# Patient Record
Sex: FEMALE | Race: WHITE | NOT HISPANIC OR LATINO | Employment: UNEMPLOYED | URBAN - METROPOLITAN AREA
[De-identification: names, ages, dates, MRNs, and addresses within clinical notes are randomized per-mention and may not be internally consistent; named-entity substitution may affect disease eponyms.]

---

## 2017-01-04 ENCOUNTER — PRENATAL OFFICE VISIT (OUTPATIENT)
Dept: OBGYN | Facility: CLINIC | Age: 27
End: 2017-01-04
Payer: COMMERCIAL

## 2017-01-04 VITALS
WEIGHT: 113 LBS | DIASTOLIC BLOOD PRESSURE: 65 MMHG | SYSTOLIC BLOOD PRESSURE: 109 MMHG | HEIGHT: 64 IN | BODY MASS INDEX: 19.29 KG/M2 | TEMPERATURE: 97.6 F | OXYGEN SATURATION: 100 % | HEART RATE: 68 BPM

## 2017-01-04 DIAGNOSIS — R20.2 PARESTHESIAS: ICD-10-CM

## 2017-01-04 DIAGNOSIS — Z34.80 ENCOUNTER FOR SUPERVISION OF OTHER NORMAL PREGNANCY: Primary | ICD-10-CM

## 2017-01-04 LAB
ANION GAP SERPL CALCULATED.3IONS-SCNC: 10 MMOL/L (ref 3–14)
BUN SERPL-MCNC: 10 MG/DL (ref 7–30)
CALCIUM SERPL-MCNC: 8.7 MG/DL (ref 8.5–10.1)
CHLORIDE SERPL-SCNC: 104 MMOL/L (ref 94–109)
CO2 SERPL-SCNC: 25 MMOL/L (ref 20–32)
CREAT SERPL-MCNC: 0.57 MG/DL (ref 0.52–1.04)
GFR SERPL CREATININE-BSD FRML MDRD: NORMAL ML/MIN/1.7M2
GLUCOSE SERPL-MCNC: 84 MG/DL (ref 70–99)
MAGNESIUM SERPL-MCNC: 1.8 MG/DL (ref 1.6–2.3)
POTASSIUM SERPL-SCNC: 3.8 MMOL/L (ref 3.4–5.3)
SODIUM SERPL-SCNC: 139 MMOL/L (ref 133–144)

## 2017-01-04 PROCEDURE — 99207 ZZC PRENATAL VISIT: CPT | Performed by: OBSTETRICS & GYNECOLOGY

## 2017-01-04 PROCEDURE — 36415 COLL VENOUS BLD VENIPUNCTURE: CPT | Performed by: OBSTETRICS & GYNECOLOGY

## 2017-01-04 PROCEDURE — 80048 BASIC METABOLIC PNL TOTAL CA: CPT | Performed by: OBSTETRICS & GYNECOLOGY

## 2017-01-04 PROCEDURE — 83735 ASSAY OF MAGNESIUM: CPT | Performed by: OBSTETRICS & GYNECOLOGY

## 2017-01-04 NOTE — PATIENT INSTRUCTIONS
If you have any questions regarding your visit, Please contact your care team.     [facility]: [phone]    [facility] CLINIC HOURS TELEPHONE NUMBER   HAYLEY Feng-    Roxanna Beltran-BLAISE Rodríguez-Medical Assistant   Monday-[place]  8:00a.m-4:45 p.m  Wednesday-[place] 8:00a.m-4:45 p.m.  Thursday-[place]  8:00a.m-4:45 p.m.  Friday-[place]  8:00a.m-4:45 p.m. [facility]  [address]  [place], [place] [address] [phone] ask [facility]  [phone] Fax  Imaging [phone]    [facility] Labor and Delivery  [address] [place] [address] Dr.  [place], [place] [address] [phone]    [facility]  [address] [place] [address]  [phone] ask [facility]  [phone] Fax  Imaging [phone]     Urgent Care locations:    [place]        [place] Monday-Friday  5 pm - 9 pm  Saturday and Sunday   9 am - 5 pm    Monday-Friday   11 am - 9 pm  Saturday and Sunday   9 am - 5 pm   [phone]       If you need a medication refill, please contact your pharmacy. Please allow 3 business days for your refill to be completed.  As always, Thank you for trusting us with your healthcare needs!

## 2017-01-04 NOTE — NURSING NOTE
"Chief Complaint   Patient presents with     Prenatal Care     eye issue, numbness, dizziness       Initial /65 mmHg  Pulse 68  Temp(Src) 97.6  F (36.4  C) (Oral)  Ht 5' 4\" (1.626 m)  Wt 113 lb (51.256 kg)  BMI 19.39 kg/m2  SpO2 100%  LMP 08/23/2016 Estimated body mass index is 19.39 kg/(m^2) as calculated from the following:    Height as of this encounter: 5' 4\" (1.626 m).    Weight as of this encounter: 113 lb (51.256 kg).  BP completed using cuff size: regular. Brandon, CMA      "

## 2017-01-04 NOTE — MR AVS SNAPSHOT
After Visit Summary   1/4/2017    Marizol Craig    MRN: [id number]           Patient Information     Date Of Birth          1990        Visit Information        Provider Department      1/4/2017 3:45 PM Carlos Frost MD [facility]        Care Instructions                                                        If you have any questions regarding your visit, Please contact your care team.     [facility]: [phone]    [facility] CLINIC HOURS TELEPHONE NUMBER   Carlos Frost M.D.      Zenaida-    Roxanna Beltran-BLAISE Rodríguez-Medical Assistant   Monday-[place]  8:00a.m-4:45 p.m  Wednesday-[place] 8:00a.m-4:45 p.m.  Thursday-[place]  8:00a.m-4:45 p.m.  [facility]  8:00a.m-4:45 p.m. [facility]  [address] [place] [address].  [place], [place] [address] [phone] [facility]  [phone] Fax  Imaging [phone]    [facility] Labor and Delivery  [address] [place] [address] [address].  [place], [place] [address] [phone]    [facility]  [address] [facility] [place] [address]  [phone] [facility]  [phone] Fax  Imaging [phone]     Urgent Care locations:    [facility] Monday-Friday  5 pm - 9 pm  Saturday and Sunday   9 am - 5 pm    Monday-Friday   11 am - 9 pm  Saturday and Sunday   9 am - 5 pm   [phone]       If you need a medication refill, please contact your pharmacy. Please allow 3 business days for your refill to be completed.  As always, Thank you for trusting us with your healthcare needs!          Follow-ups after your visit        Your next 10 appointments already scheduled     Jan 04, 2017  3:45 PM   ESTABLISHED PRENATAL with Carlos Frost MD   [facility] ([facility])    [address] [facility] [address]   [phone]            Constantine 10,  "2017  4:20 PM   US OB > 14 WEEKS COMPLETE SINGLE with MGUS1, MG [facility] ([facility])    [address] [place] [address] [phone]           Please bring a list of your medicines (including vitamins, minerals and over-the-counter drugs). Also, tell your doctor about any allergies you may have. Wear comfortable clothes and leave your valuables at home.  If you re less than 20 weeks drink four 8-ounce glasses of fluid an hour before your exam. If you need to empty your bladder before your exam, try to release only a little urine. Then, drink another glass of fluid.  You may have up to two family members in the exam room. If you bring a small child, an adult must be there to care for him or her.  Please call the Imaging Department at your exam site with any questions.              Who to contact     If you have questions or need follow up information about today's clinic visit or your schedule please contact [facility] directly at [phone].  Normal or non-critical lab and imaging results will be communicated to you by [employer], letter or phone within 4 business days after the clinic has received the results. If you do not hear from us within 7 days, please contact the clinic through [employer]t or phone. If you have a critical or abnormal lab result, we will notify you by phone as soon as possible.  Submit refill requests through [employer] or call your pharmacy and they will forward the refill request to us. Please allow 3 business days for your refill to be completed.          Additional Information About Your Visit        [employer] Information     [employer] lets you send messages to your doctor, view your test results, renew your prescriptions, schedule appointments and more. To sign up, go to [email or web address] . Click on \"Log in\" on the left side of the screen, which will take you to the Welcome page. Then click on \"Sign up Now\" " "on the right side of the page.     You will be asked to enter the access code listed below, as well as some personal information. Please follow the directions to create your username and password.     Your access code is: [email or web address]  Expires: 3/7/2017 10:26 AM     Your access code will  in 90 days. If you need help or a new code, please call your [facility] clinic or [phone].        Care EveryWhere ID     This is your Care EveryWhere ID. This could be used by other organizations to access your [facility] medical records  [id number]        Your Vitals Were     Pulse Temperature Height BMI (Body Mass Index) Pulse Oximetry Last Period    68 97.6  F (36.4  C) (Oral) 5' 4\" (1.626 m) 19.39 kg/m2 100% 2016       Blood Pressure from Last 3 Encounters:   17 109/65   16 110/76   16 84/60    Weight from Last 3 Encounters:   17 113 lb (51.256 kg)   16 107 lb (48.535 kg)   16 107 lb (48.535 kg)              Today, you had the following     No orders found for display       Primary Care Provider    [facility]       No address on file        Thank you!     Thank you for choosing [facility]  for your care. Our goal is always to provide you with excellent care. Hearing back from our patients is one way we can continue to improve our services. Please take a few minutes to complete the written survey that you may receive in the mail after your visit with us. Thank you!             Your Updated Medication List - Protect others around you: Learn how to safely use, store and throw away your medicines at www.disposemymeds.org.          This list is accurate as of: 17  3:38 PM.  Always use your most recent med list.                   Brand Name Dispense Instructions for use    PRENATAL PO            "

## 2017-01-05 NOTE — PROGRESS NOTES
Planned pregnancy. Doing ok. No signif signs, symptoms or concerns except episodic eye twitching, numbness in fingers and tongue, tingling in legs, and dizziness- labs sent and observe. Advice as per Anticipatory Guidance/Checklist update. Discussed condition, tests and care plan including RBA. Problem list updated. Survey u/s next.  Carlos Frost MD

## 2017-01-10 ENCOUNTER — RADIANT APPOINTMENT (OUTPATIENT)
Dept: ULTRASOUND IMAGING | Facility: CLINIC | Age: 27
End: 2017-01-10
Attending: OBSTETRICS & GYNECOLOGY
Payer: COMMERCIAL

## 2017-01-10 DIAGNOSIS — Z34.80 ENCOUNTER FOR SUPERVISION OF OTHER NORMAL PREGNANCY: ICD-10-CM

## 2017-01-10 PROCEDURE — 76805 OB US >/= 14 WKS SNGL FETUS: CPT | Performed by: RADIOLOGY

## 2017-01-13 ENCOUNTER — TELEPHONE (OUTPATIENT)
Dept: OBGYN | Facility: CLINIC | Age: 27
End: 2017-01-13

## 2017-01-13 NOTE — TELEPHONE ENCOUNTER
Pt had US done on 1/10/17. Looks like it was normal except  forRight ventricular outflow tract not well  visualized. No abnormality identified. Patient was told she would need a follow up US. Ok to place order? She would like to schedule.  Velma Villegas LPN

## 2017-01-14 NOTE — TELEPHONE ENCOUNTER
Will discuss this at next OB visit. This is a common limitation of u/s and follow-up may be considered but not required. We should wait until there has been some fetal growth to check this again. Please notify.   Carlos Frost MD

## 2017-01-30 ENCOUNTER — PRENATAL OFFICE VISIT (OUTPATIENT)
Dept: OBGYN | Facility: CLINIC | Age: 27
End: 2017-01-30
Payer: COMMERCIAL

## 2017-01-30 VITALS
RESPIRATION RATE: 16 BRPM | DIASTOLIC BLOOD PRESSURE: 53 MMHG | HEART RATE: 75 BPM | WEIGHT: 118 LBS | SYSTOLIC BLOOD PRESSURE: 97 MMHG | BODY MASS INDEX: 20.24 KG/M2

## 2017-01-30 DIAGNOSIS — Z34.80 ENCOUNTER FOR SUPERVISION OF OTHER NORMAL PREGNANCY: Primary | ICD-10-CM

## 2017-01-30 PROCEDURE — 99207 ZZC PRENATAL VISIT: CPT | Performed by: OBSTETRICS & GYNECOLOGY

## 2017-01-30 NOTE — NURSING NOTE
"Chief Complaint   Patient presents with     Prenatal Care     OBV 22w6d       Initial BP 97/53 mmHg  Pulse 75  Resp 16  Wt 53.524 kg (118 lb)  LMP 08/23/2016  Breastfeeding? No Estimated body mass index is 20.24 kg/(m^2) as calculated from the following:    Height as of 1/4/17: 1.626 m (5' 4\").    Weight as of this encounter: 53.524 kg (118 lb).  BP completed using cuff size: regular Right arm  Pepei ISAIAS Rasheed      "

## 2017-01-30 NOTE — MR AVS SNAPSHOT
After Visit Summary   1/30/2017    Marizol Craig    MRN: [id number]           Patient Information     Date Of Birth          1990        Visit Information        Provider Department      1/30/2017 4:15 PM Carlos Frost MD [facility]        Today's Diagnoses     Encounter for supervision of other normal pregnancy    -  1       Care Instructions                                                        If you have any questions regarding your visit, Please contact your care team.     [facility] Access Services: [phone]    [facility] CLINIC HOURS TELEPHONE NUMBER   Carlos Frost M.D.      Zenaida-    Roxanna Beltran-BLAISE Rodríguez-Medical Assistant   Monday-[place]  8:00a.m-4:45 p.m  Wednesday-[place] 8:00a.m-4:45 p.m.  Thursday-[place]  8:00a.m-4:45 p.m.  Friday-[place]  8:00a.m-4:45 p.m. [facility]  [address]  [place], [place] [address] [phone] ask for [facility]  [phone] Fax  Imaging [phone]    [facility] Labor and Delivery  [address] [place] [address]  [phone]    [facility]  [address] [facility] [place] [address]  [phone] ask [facility]  [phone] Fax  Imaging [phone]     Urgent Care locations:    [facility] Monday-Friday  5 pm - 9 pm  Saturday and Sunday   9 am - 5 pm    Monday-Friday   11 am - 9 pm  Saturday and Sunday   9 am - 5 pm   [phone]       If you need a medication refill, please contact your pharmacy. Please allow 3 business days for your refill to be completed.  As always, Thank you for trusting us with your healthcare needs!          Follow-ups after your visit        Who to contact     If you have questions or need follow up information about today's clinic visit or your schedule please contact [facility] directly at  "[phone].  Normal or non-critical lab and imaging results will be communicated to you by MyChart, letter or phone within 4 business days after the clinic has received the results. If you do not hear from us within 7 days, please contact the clinic through RAB[employer]art or phone. If you have a critical or abnormal lab result, we will notify you by phone as soon as possible.  Submit refill requests through Sp[employer] or call your pharmacy and they will forward the refill request to us. Please allow 3 business days for your refill to be completed.          Additional Information About Your Visit        MyChart Information     Spime lets you send messages to your doctor, view your test results, renew your prescriptions, schedule appointments and more. To sign up, go to [email or web address] . Click on \"Log in\" on the left side of the screen, which will take you to the Welcome page. Then click on \"Sign up Now\" on the right side of the page.     You will be asked to enter the access code listed below, as well as some personal information. Please follow the directions to create your username and password.     Your access code is: [email or web address]  Expires: 3/7/2017 10:26 AM     Your access code will  in 90 days. If you need help or a new code, please call your [facility] clinic or [phone].        Care EveryWhere ID     This is your Care EveryWhere ID. This could be used by other organizations to access your [facility] medical records  [id number]        Your Vitals Were     Pulse Respirations Last Period Breastfeeding?          75 16 2016 No         Blood Pressure from Last 3 Encounters:   17 97/53   17 109/65   16 110/76    Weight from Last 3 Encounters:   17 53.524 kg (118 lb)   17 51.256 kg (113 lb)   16 48.535 kg (107 lb)              Today, you had the following     No orders found for display       Primary Care Provider    [facility]       No " address on file        Thank you!     Thank you for choosing [facility]  for your care. Our goal is always to provide you with excellent care. Hearing back from our patients is one way we can continue to improve our services. Please take a few minutes to complete the written survey that you may receive in the mail after your visit with us. Thank you!             Your Updated Medication List - Protect others around you: Learn how to safely use, store and throw away your medicines at www.disposemymeds.org.          This list is accurate as of: 1/30/17  4:40 PM.  Always use your most recent med list.                   Brand Name Dispense Instructions for use    PRENATAL PO

## 2017-01-31 NOTE — PROGRESS NOTES
Doing well. No signif signs, symptoms or concerns. Prior sx resolved with dec caffeine. Advice as per Checklist update. Discussed condition, tests and care plan including RBA. Problem list updated. 1h GTT next.  Carlos Frost MD

## 2017-02-10 ENCOUNTER — TELEPHONE (OUTPATIENT)
Dept: OBGYN | Facility: CLINIC | Age: 27
End: 2017-02-10

## 2017-02-10 DIAGNOSIS — Z34.80 ENCOUNTER FOR SUPERVISION OF OTHER NORMAL PREGNANCY: Primary | ICD-10-CM

## 2017-02-10 NOTE — TELEPHONE ENCOUNTER
Left a message for patient to call back at [phone] and ask for Angelica in women's. In pregnancy it is safe to use over the counter Monistat 3 or 7 day or Gyne-Lotrimin.  Would need to be seen in clinic if she continues to have symptoms.  Angelica Ventura RN

## 2017-02-10 NOTE — TELEPHONE ENCOUNTER
Patient called back.  She has noticed only in the evenings some minor itch and burning sensations.  During the day she is asymptomatic.  She  had a yeast infection during her previous pregnancy and said symptoms are somewhat similar.  I advised otc Monistat.  I encouraged her to increase her water intake.  She will follow up in clinic if her symptoms persist or worsen.  Angelica Ventura RN

## 2017-02-10 NOTE — TELEPHONE ENCOUNTER
Pt thinks she may have a yeast infection  Calling to see what you recommend for treatment    Call to advise and ok to leave message  Marizol Craig (Self) [phone] (H)    Uses [facility] [phone]

## 2017-02-27 ENCOUNTER — PRENATAL OFFICE VISIT (OUTPATIENT)
Dept: OBGYN | Facility: CLINIC | Age: 27
End: 2017-02-27
Payer: COMMERCIAL

## 2017-02-27 VITALS
WEIGHT: 123 LBS | DIASTOLIC BLOOD PRESSURE: 67 MMHG | BODY MASS INDEX: 21.11 KG/M2 | HEART RATE: 75 BPM | SYSTOLIC BLOOD PRESSURE: 110 MMHG

## 2017-02-27 DIAGNOSIS — Z34.80 ENCOUNTER FOR SUPERVISION OF OTHER NORMAL PREGNANCY: ICD-10-CM

## 2017-02-27 LAB
GLUCOSE 1H P 50 G GLC PO SERPL-MCNC: 141 MG/DL (ref 60–129)
HGB BLD-MCNC: 12 G/DL (ref 11.7–15.7)

## 2017-02-27 PROCEDURE — 85018 HEMOGLOBIN: CPT | Performed by: OBSTETRICS & GYNECOLOGY

## 2017-02-27 PROCEDURE — 36415 COLL VENOUS BLD VENIPUNCTURE: CPT | Performed by: OBSTETRICS & GYNECOLOGY

## 2017-02-27 PROCEDURE — 99207 ZZC PRENATAL VISIT: CPT | Performed by: OBSTETRICS & GYNECOLOGY

## 2017-02-27 PROCEDURE — 82950 GLUCOSE TEST: CPT | Performed by: OBSTETRICS & GYNECOLOGY

## 2017-02-27 NOTE — NURSING NOTE
"Chief Complaint   Patient presents with     Prenatal Care     OBV 26w6d       Initial /67 (BP Location: Right arm, Patient Position: Chair, Cuff Size: Adult Regular)  Pulse 75  Wt 55.8 kg (123 lb)  LMP 08/23/2016  Breastfeeding? No  BMI 21.11 kg/m2 Estimated body mass index is 21.11 kg/(m^2) as calculated from the following:    Height as of 1/4/17: 1.626 m (5' 4\").    Weight as of this encounter: 55.8 kg (123 lb).  Medication Reconciliation: complete   Anne Rasheed CMA      "

## 2017-02-27 NOTE — MR AVS SNAPSHOT
After Visit Summary   2/27/2017    Marizol Craig    MRN: [id number]           Patient Information     Date Of Birth          1990        Visit Information        Provider Department      2/27/2017 3:15 PM Carlos Frost MD [facility]        Today's Diagnoses     Encounter for supervision of other normal pregnancy          Care Instructions                                                        If you have any questions regarding your visit, Please contact your care team.     [facility] Access Services: [phone]    [facility] CLINIC HOURS TELEPHONE NUMBER   Carlos Frost M.D.      Zenaida-    Roxanna Beltran-BLAISE Rodríguez-Medical Assistant   Monday-[place]  8:00a.m-4:45 p.m  Wednesday-[place] 8:00a.m-4:45 p.m.  Thursday-[place]  8:00a.m-4:45 p.m.  Friday-[place]  8:00a.m-4:45 p.m. [facility]  [address]  [place], [place] [address] [phone] ask for [facility]  [phone] Fax  Imaging [phone]    [facility] Labor and Delivery  [address]  [place], [place] [address] [phone]    [facility]  [address] [facility] [place] [address]  [phone] ask [facility]  [phone] Fax  Imaging [phone]     Urgent Care locations:    [facility] Monday-Friday  5 pm - 9 pm  Saturday and Sunday   9 am - 5 pm    Monday-Friday   11 am - 9 pm  Saturday and Sunday   9 am - 5 pm   [phone]       If you need a medication refill, please contact your pharmacy. Please allow 3 business days for your refill to be completed.  As always, Thank you for trusting us with your healthcare needs!          Follow-ups after your visit        Who to contact     If you have questions or need follow up information about today's clinic visit or your schedule please contact [facility] directly at [phone].  Normal  "or non-critical lab and imaging results will be communicated to you by MyChart, letter or phone within 4 business days after the clinic has received the results. If you do not hear from us within 7 days, please contact the clinic through [employer] or phone. If you have a critical or abnormal lab result, we will notify you by phone as soon as possible.  Submit refill requests through [employer] or call your pharmacy and they will forward the refill request to us. Please allow 3 business days for your refill to be completed.          Additional Information About Your Visit        [employer] Information     [employer] lets you send messages to your doctor, view your test results, renew your prescriptions, schedule appointments and more. To sign up, go to [email or web address] . Click on \"Log in\" on the left side of the screen, which will take you to the Welcome page. Then click on \"Sign up Now\" on the right side of the page.     You will be asked to enter the access code listed below, as well as some personal information. Please follow the directions to create your username and password.     Your access code is: [email or web address]  Expires: 3/7/2017 10:26 AM     Your access code will  in 90 days. If you need help or a new code, please call your [facility] clinic or [phone].        Care EveryWhere ID     This is your Care EveryWhere ID. This could be used by other organizations to access your [facility] medical records  [id number]        Your Vitals Were     Pulse Last Period Breastfeeding? BMI (Body Mass Index)          75 2016 No 21.11 kg/m2         Blood Pressure from Last 3 Encounters:   17 110/67   17 97/53   17 109/65    Weight from Last 3 Encounters:   17 55.8 kg (123 lb)   17 53.5 kg (118 lb)   17 51.3 kg (113 lb)              We Performed the Following     Glucose tolerance gest screen 1 hour     Hemoglobin        Primary Care Provider    [facility] "       No address on file        Thank you!     Thank you for choosing [facility]  for your care. Our goal is always to provide you with excellent care. Hearing back from our patients is one way we can continue to improve our services. Please take a few minutes to complete the written survey that you may receive in the mail after your visit with us. Thank you!             Your Updated Medication List - Protect others around you: Learn how to safely use, store and throw away your medicines at www.disposemymeds.org.          This list is accurate as of: 2/27/17  3:38 PM.  Always use your most recent med list.                   Brand Name Dispense Instructions for use    PRENATAL PO

## 2017-02-27 NOTE — PATIENT INSTRUCTIONS
If you have any questions regarding your visit, Please contact your care team.     [facility] Access [facility]: [phone]    [facility] CLINIC HOURS TELEPHONE NUMBER   HAYLEY Feng-    Roxanna Beltran-BLAISE Rodríguez-Medical Assistant   Monday-[place]  8:00a.m-4:45 p.m  Wednesday-[place] 8:00a.m-4:45 p.m.  Thursday-[place]  8:00a.m-4:45 p.m.  Friday-[place]  8:00a.m-4:45 p.m. [facility]  [address]  [place], [place] [address] [phone] ask [facility]  [phone] Fax  Imaging [phone]    [facility] Labor and Delivery  [address] [place] [address] Dr.  [place], [place] [address] [phone]    [facility]  [address] [place] [address]  [phone] ask [facility]  [phone] Fax  Imaging [phone]     Urgent Care locations:    [place]        [place] Monday-Friday  5 pm - 9 pm  Saturday and Sunday   9 am - 5 pm    Monday-Friday   11 am - 9 pm  Saturday and Sunday   9 am - 5 pm   [phone]       If you need a medication refill, please contact your pharmacy. Please allow 3 business days for your refill to be completed.  As always, Thank you for trusting us with your healthcare needs!

## 2017-02-28 ENCOUNTER — TELEPHONE (OUTPATIENT)
Dept: OBGYN | Facility: CLINIC | Age: 27
End: 2017-02-28

## 2017-02-28 NOTE — PROGRESS NOTES
Doing well. No signif signs, symptoms or concerns except discussed [facility] concerns. Advice as per Checklist update. Discussed condition, tests and care plan including RBA. Problem list updated. Consider Tdap next.  Carlos Frost MD

## 2017-02-28 NOTE — TELEPHONE ENCOUNTER
"Spent > 9 mins on the phone as pt is not happy about failing the 1 hr GTT. She verbalized concerns that she will have additional costs for the 3 hr test. Explained she needed to double check with insurance it is medically indicated as she failed the 1 hr test. Told her to call insurance. Pt also stated that Dr. Frost told her at her last appt that since she did not have gestational DM with first baby \"I didn't have to worry about it.\" Pt stated several times that she \"the drink was horrible\" and does not want to \"sit there forever.\" Pt stated she spoke to her sister and \"hers was negative too.\" Pt verbalized several times that she was \"told by the lady\" to not alter her diet for 1 hr GTT and she had coffee that morning \"and I know that caused it.\" Pt stated she had test at 1500. Several times attempted to explain to the best of my ability about the screening test on a typical diet day and then if failed then a more controlled test with fasting. Pt also stated she \"did some googling.\"   Pt requested a call from Dr. Frost \"since he told me I did not have to worry.\" Explained will get a msg to Dr. Frost but he is out of clinic for the rest of day but will see if he is able to call her tomorrow. Pt agreed to wait for return call. Ruby Polk RN, BAN    "

## 2017-03-01 NOTE — TELEPHONE ENCOUNTER
Reason for Call:  Other call back    Detailed comments: Pt returning phone call and will await to hear back from Dr. Frost later this afternoon regarding results.    Phone Number Patient can be reached at: Home number on file [phone] (home)    Best Time: Anytime    Can we leave a detailed message on this number? YES    Call taken on 3/1/2017 at 1:51 PM by Willy Shea

## 2017-03-01 NOTE — TELEPHONE ENCOUNTER
I called pt again- LMTC. We can observe, repeat the 1h screen, or do the 3h GTT.   Carlos Frost MD

## 2017-03-02 NOTE — TELEPHONE ENCOUNTER
I called pt and discussed this and options. Reassured. Plans to do the 3h GTT soon. Instructions given.  Carlos Frost MD

## 2017-03-04 ENCOUNTER — TELEPHONE (OUTPATIENT)
Dept: LAB | Facility: CLINIC | Age: 27
End: 2017-03-04

## 2017-03-06 DIAGNOSIS — Z34.80 ENCOUNTER FOR SUPERVISION OF OTHER NORMAL PREGNANCY: ICD-10-CM

## 2017-03-06 DIAGNOSIS — Z53.9 ERRONEOUS ENCOUNTER--DISREGARD: Primary | ICD-10-CM

## 2017-03-06 LAB
GLUCOSE 1H P 100 G GLC PO SERPL-MCNC: 179 MG/DL (ref 60–179)
GLUCOSE 2H P 100 G GLC PO SERPL-MCNC: 117 MG/DL (ref 60–154)
GLUCOSE 3H P 100 G GLC PO SERPL-MCNC: 115 MG/DL (ref 60–139)
GLUCOSE P FAST SERPL-MCNC: 90 MG/DL (ref 60–94)

## 2017-03-06 PROCEDURE — 82951 GLUCOSE TOLERANCE TEST (GTT): CPT | Performed by: OBSTETRICS & GYNECOLOGY

## 2017-03-06 PROCEDURE — 82952 GTT-ADDED SAMPLES: CPT | Performed by: OBSTETRICS & GYNECOLOGY

## 2017-03-06 PROCEDURE — 36415 COLL VENOUS BLD VENIPUNCTURE: CPT | Performed by: OBSTETRICS & GYNECOLOGY

## 2017-03-08 ENCOUNTER — TELEPHONE (OUTPATIENT)
Dept: OBGYN | Facility: CLINIC | Age: 27
End: 2017-03-08

## 2017-03-08 DIAGNOSIS — Z34.80 ENCOUNTER FOR SUPERVISION OF OTHER NORMAL PREGNANCY: ICD-10-CM

## 2017-03-08 NOTE — TELEPHONE ENCOUNTER
Reason for call: Symptom   Symptom or request: Blurred vision , weakness on Sunday 03/05 and today 03/08    Duration (how long have symptoms been present): on 03/05 and 03/08  Have you been treated for this before? Yes    Additional comments: Please call patient and advise , patient stated she has discussed this with Dr Frost in the past     Phone Number Pt can be reached at: Home number on file [phone] (home)  Best Time: any   Can we leave a detailed message on this number? YES

## 2017-03-09 NOTE — TELEPHONE ENCOUNTER
I called patient.  She has been dealing with a sick child at home for the past several days.  Patient stated she is feeling good except she has a little fuzzy vision to her left eye today.  Sunday she had a really bad pounding headache and was in bed most of the day.  She had blurry vision in both eyes on Sunday.  Monday and Tuesday she was asymptomatic.  Yesterday she didn't get a headache but had blurry vision in her left eye.   Today she notes very mild blurriness.  She is eating well but only drinks 2-3  glasses of water daily. She has cut back on her caffeine intake.  She has one coffee daily if that.  She doesn't drink pop.  Patient stated lying down makes her symptoms stop.  Patient denied chest pain or palpitations.  She feels dizzy sometimes during the day. She denied pain or redness to either eye and symptoms come on gradual.  I advise patient increase her water intake to 8-10 eight ounce glasses daily.  Patient has a hard time drinking plain water but can tolerate vitamin water.  I advised this is ok. I explained how dehydration can bring on headaches and vision changes and dizziness.  If patient has any acute symptoms, she will have a  take her to the ER.  She will call the clinic back if symptoms worsen or persist.  Next ob visit is on 3/20/2017.  Angelica Ventura RN

## 2017-04-17 ENCOUNTER — OFFICE VISIT (OUTPATIENT)
Dept: OBGYN | Facility: CLINIC | Age: 27
End: 2017-04-17
Payer: COMMERCIAL

## 2017-04-17 VITALS
BODY MASS INDEX: 22.14 KG/M2 | SYSTOLIC BLOOD PRESSURE: 110 MMHG | HEART RATE: 81 BPM | TEMPERATURE: 98.7 F | DIASTOLIC BLOOD PRESSURE: 68 MMHG | WEIGHT: 129 LBS

## 2017-04-17 DIAGNOSIS — Z34.80 ENCOUNTER FOR SUPERVISION OF OTHER NORMAL PREGNANCY: ICD-10-CM

## 2017-04-17 PROCEDURE — 99207 ZZC PRENATAL VISIT: CPT | Performed by: OBSTETRICS & GYNECOLOGY

## 2017-04-17 NOTE — MR AVS SNAPSHOT
After Visit Summary   4/17/2017    Marizol Craig    MRN: [id number]           Patient Information     Date Of Birth          1990        Visit Information        Provider Department      4/17/2017 2:45 PM Carlos Frost MD [facility]        Today's Diagnoses     Encounter for supervision of other normal pregnancy          Care Instructions                                                        If you have any questions regarding your visit, Please contact your care team.     [facility] Access Services: [phone]    [facility] CLINIC HOURS TELEPHONE NUMBER   Carlos Frost M.D.      Zenaida-    Roxanna Beltran-BLAISE Rodríguez-Medical Assistant   Monday-[place]  8:00a.m-4:45 p.m  Wednesday-[place] 8:00a.m-4:45 p.m.  Thursday-[place]  8:00a.m-4:45 p.m.  Friday-[place]  8:00a.m-4:45 p.m. [facility]  [address]  [place], [place] [address] [phone] ask for [facility]  [phone] Fax  Imaging [phone]    [facility] Labor and Delivery  [address]  [place], [place] [address] [phone]    [facility]  [address] [facility] [place] [address]  [phone] ask [facility]  [phone] Fax  Imaging [phone]     Urgent Care locations:    [facility] Monday-Friday  5 pm - 9 pm  Saturday and Sunday   9 am - 5 pm    Monday-Friday   11 am - 9 pm  Saturday and Sunday   9 am - 5 pm   [phone]       If you need a medication refill, please contact your pharmacy. Please allow 3 business days for your refill to be completed.  As always, Thank you for trusting us with your healthcare needs!          Follow-ups after your visit        Who to contact     If you have questions or need follow up information about today's clinic visit or your schedule please contact [facility] directly at [phone].  Normal  "or non-critical lab and imaging results will be communicated to you by MyChart, letter or phone within 4 business days after the clinic has received the results. If you do not hear from us within 7 days, please contact the clinic through [employer] or phone. If you have a critical or abnormal lab result, we will notify you by phone as soon as possible.  Submit refill requests through [employer] or call your pharmacy and they will forward the refill request to us. Please allow 3 business days for your refill to be completed.          Additional Information About Your Visit        oohiloveharForticom Information     [employer] lets you send messages to your doctor, view your test results, renew your prescriptions, schedule appointments and more. To sign up, go to [email or web address] . Click on \"Log in\" on the left side of the screen, which will take you to the Welcome page. Then click on \"Sign up Now\" on the right side of the page.     You will be asked to enter the access code listed below, as well as some personal information. Please follow the directions to create your username and password.     Your access code is: [email or web address]  Expires: 2017  2:55 PM     Your access code will  in 90 days. If you need help or a new code, please call your [place] clinic or [phone].        Care EveryWhere ID     This is your Care EveryWhere ID. This could be used by other organizations to access your [place] medical records  [id number]        Your Vitals Were     Pulse Temperature Last Period Breastfeeding? BMI (Body Mass Index)       81 98.7  F (37.1  C) (Oral) 2016 No 22.14 kg/m2        Blood Pressure from Last 3 Encounters:   17 110/68   17 110/67   17 97/53    Weight from Last 3 Encounters:   17 58.5 kg (129 lb)   17 55.8 kg (123 lb)   17 53.5 kg (118 lb)              Today, you had the following     No orders found for display       Primary Care Provider    [facility] [place] [facility]       No address on file        Thank you!     Thank you for choosing [facility]  for your care. Our goal is always to provide you with excellent care. Hearing back from our patients is one way we can continue to improve our services. Please take a few minutes to complete the written survey that you may receive in the mail after your visit with us. Thank you!             Your Updated Medication List - Protect others around you: Learn how to safely use, store and throw away your medicines at www.disposemymeds.org.          This list is accurate as of: 4/17/17  2:55 PM.  Always use your most recent med list.                   Brand Name Dispense Instructions for use    PRENATAL PO

## 2017-04-17 NOTE — PATIENT INSTRUCTIONS
If you have any questions regarding your visit, Please contact your care team.     [employer] Access Services: [phone]    [facility] CLINIC HOURS TELEPHONE NUMBER   HAYLEY Feng-    Roxanna Beltran-BLAISE Rodríguez-Medical Assistant   Monday-[place]  8:00a.m-4:45 p.m  Wednesday-[place] 8:00a.m-4:45 p.m.  Thursday-[place]  8:00a.m-4:45 p.m.  Friday-[place]  8:00a.m-4:45 p.m. [facility]  [address]  [place], [place] [address] [phone] ask [facility]  [phone] Fax  Imaging [phone]    [facility] Labor and Delivery  [address] [place] [address] Dr.  [place], [place] [address] [phone]    [facility]  [address] [place] [address]  [phone] ask [facility]  [phone] Fax  Imaging [phone]     Urgent Care locations:    [place]        [place] Monday-Friday  5 pm - 9 pm  Saturday and Sunday   9 am - 5 pm    Monday-Friday   11 am - 9 pm  Saturday and Sunday   9 am - 5 pm   [phone]       If you need a medication refill, please contact your pharmacy. Please allow 3 business days for your refill to be completed.  As always, Thank you for trusting us with your healthcare needs!

## 2017-04-17 NOTE — NURSING NOTE
"Chief Complaint   Patient presents with     Prenatal Care     OBV 33w6d       Initial /68 (BP Location: Right arm, Patient Position: Chair, Cuff Size: Adult Regular)  Pulse 81  Temp 98.7  F (37.1  C) (Oral)  Wt 58.5 kg (129 lb)  LMP 08/23/2016  Breastfeeding? No  BMI 22.14 kg/m2 Estimated body mass index is 22.14 kg/(m^2) as calculated from the following:    Height as of 1/4/17: 1.626 m (5' 4\").    Weight as of this encounter: 58.5 kg (129 lb).  Medication Reconciliation: complete   Anne Rasheed CMA      "

## 2017-04-18 NOTE — PROGRESS NOTES
No signif signs, symptoms or concerns except suspects breech. 3h GTT was normal. Tdap declined. Advice appropriate to gestational age reviewed including pertinent Checklist items. Discussed condition, tests and care plan including RBA. Problem list updated. GBS next.  A/P:  Marizol was seen today for prenatal care.    Diagnoses and all orders for this visit:    Encounter for supervision of other normal pregnancy        Carlos Frost MD

## 2017-05-03 ENCOUNTER — PRENATAL OFFICE VISIT (OUTPATIENT)
Dept: OBGYN | Facility: CLINIC | Age: 27
End: 2017-05-03
Payer: COMMERCIAL

## 2017-05-03 VITALS
DIASTOLIC BLOOD PRESSURE: 77 MMHG | BODY MASS INDEX: 22.31 KG/M2 | SYSTOLIC BLOOD PRESSURE: 121 MMHG | WEIGHT: 130 LBS | HEART RATE: 88 BPM

## 2017-05-03 DIAGNOSIS — Z34.80 ENCOUNTER FOR SUPERVISION OF OTHER NORMAL PREGNANCY: Primary | ICD-10-CM

## 2017-05-03 PROCEDURE — 87653 STREP B DNA AMP PROBE: CPT | Performed by: OBSTETRICS & GYNECOLOGY

## 2017-05-03 PROCEDURE — 99207 ZZC PRENATAL VISIT: CPT | Performed by: OBSTETRICS & GYNECOLOGY

## 2017-05-03 NOTE — NURSING NOTE
"Chief Complaint   Patient presents with     Prenatal Care     OBV 36w1d       Initial /77 (BP Location: Left arm, Patient Position: Chair, Cuff Size: Adult Regular)  Pulse 88  Wt 130 lb (59 kg)  LMP 08/23/2016  Breastfeeding? No  BMI 22.31 kg/m2 Estimated body mass index is 22.31 kg/(m^2) as calculated from the following:    Height as of 1/4/17: 5' 4\" (1.626 m).    Weight as of this encounter: 130 lb (59 kg).  Medication Reconciliation: complete   Anne Rasheed CMA      "

## 2017-05-03 NOTE — PATIENT INSTRUCTIONS
If you have any questions regarding your visit, Please contact your care team.     [employer] Access Services: [phone]    [facility] CLINIC HOURS TELEPHONE NUMBER   HAYLEY Feng-    Roxanna Beltran-BLAISE Rodríguez-Medical Assistant   Monday-[place]  8:00a.m-4:45 p.m  Wednesday-[place] 8:00a.m-4:45 p.m.  Thursday-[place]  8:00a.m-4:45 p.m.  Friday-[place]  8:00a.m-4:45 p.m. [facility]  [address]  [place], [place] [address] [phone] ask [facility]  [phone] Fax  Imaging [phone]    [facility] Labor and Delivery  [address] [place] [address] Dr.  [place], [place] [address] [phone]    [facility]  [address] [place] [address]  [phone] ask [facility]  [phone] Fax  Imaging [phone]     Urgent Care locations:    [place]        [place] Monday-Friday  5 pm - 9 pm  Saturday and Sunday   9 am - 5 pm    Monday-Friday   11 am - 9 pm  Saturday and Sunday   9 am - 5 pm   [phone]       If you need a medication refill, please contact your pharmacy. Please allow 3 business days for your refill to be completed.  As always, Thank you for trusting us with your healthcare needs!

## 2017-05-03 NOTE — MR AVS SNAPSHOT
After Visit Summary   5/3/2017    Marizol Craig    MRN: [id number]           Patient Information     Date Of Birth          1990        Visit Information        Provider Department      5/3/2017 3:15 PM Carlos Frost MD [facility]        Today's Diagnoses     Encounter for supervision of other normal pregnancy    -  1      Care Instructions                                                        If you have any questions regarding your visit, Please contact your care team.     [facility] Access Services: [phone]    [facility] CLINIC HOURS TELEPHONE NUMBER   Carlos Frost M.D.      Zenaida-    Roxanna Beltran-BLAISE Rodríguez-Medical Assistant   Monday-[place]  8:00a.m-4:45 p.m  Wednesday-[place] 8:00a.m-4:45 p.m.  Thursday-[place]  8:00a.m-4:45 p.m.  Friday-[place]  8:00a.m-4:45 p.m. [facility]  [address] [place] [address] [address].  [place], [place] [address] [phone] [facility]  [phone] Fax  Imaging [phone]    [facility] Labor and Delivery  [address]  [place], [place] [address] [phone]    [facility]  [address] [facility] [place] [address] [phone] [facility]  [phone] Fax  Imaging [phone]     Urgent Care locations:    [facility] Monday-Friday  5 pm - 9 pm  Saturday and Sunday   9 am - 5 pm    Monday-Friday   11 am - 9 pm  Saturday and Sunday   9 am - 5 pm   [phone]       If you need a medication refill, please contact your pharmacy. Please allow 3 business days for your refill to be completed.  As always, Thank you for trusting us with your healthcare needs!          Follow-ups after your visit        Your next 10 appointments already scheduled     May 15, 2017  3:15 PM CDT   ESTABLISHED PRENATAL with Carlos Frost MD   [facility]     "[address] [place] [address] [phone]              Who to contact     If you have questions or need follow up information about today's clinic visit or your schedule please contact [facility] directly at [phone].  Normal or non-critical lab and imaging results will be communicated to you by MyChart, letter or phone within 4 business days after the clinic has received the results. If you do not hear from us within 7 days, please contact the clinic through MyChart or phone. If you have a critical or abnormal lab result, we will notify you by phone as soon as possible.  Submit refill requests through [employer] or call your pharmacy and they will forward the refill request to us. Please allow 3 business days for your refill to be completed.          Additional Information About Your Visit        MyChart Information     [employer] lets you send messages to your doctor, view your test results, renew your prescriptions, schedule appointments and more. To sign up, go to [email or web address] . Click on \"Log in\" on the left side of the screen, which will take you to the Welcome page. Then click on \"Sign up Now\" on the right side of the page.     You will be asked to enter the access code listed below, as well as some personal information. Please follow the directions to create your username and password.     Your access code is: [email or web address]  Expires: 2017  2:55 PM     Your access code will  in 90 days. If you need help or a new code, please call your [place] clinic or [phone].        Care EveryWhere ID     This is your Care EveryWhere ID. This could be used by other organizations to access your [place] medical records  [id number]        Your Vitals Were     Pulse Last Period Breastfeeding? BMI (Body Mass Index)          88 2016 No 22.31 kg/m2         Blood Pressure from Last 3 Encounters:   17 121/77   17 110/68   17 110/67    Weight " from Last 3 Encounters:   05/03/17 130 lb (59 kg)   04/17/17 129 lb (58.5 kg)   02/27/17 123 lb (55.8 kg)              We Performed the Following     Group B strep PCR        Primary Care Provider    [facility]       No address on file        Thank you!     Thank you for choosing [facility]  for your care. Our goal is always to provide you with excellent care. Hearing back from our patients is one way we can continue to improve our services. Please take a few minutes to complete the written survey that you may receive in the mail after your visit with us. Thank you!             Your Updated Medication List - Protect others around you: Learn how to safely use, store and throw away your medicines at www.disposemymeds.org.          This list is accurate as of: 5/3/17  4:05 PM.  Always use your most recent med list.                   Brand Name Dispense Instructions for use    PRENATAL PO

## 2017-05-04 LAB
GP B STREP DNA SPEC QL NAA+PROBE: NORMAL
SPECIMEN SOURCE: NORMAL

## 2017-05-04 NOTE — PROGRESS NOTES
No signif signs, symptoms or concerns. Advice appropriate to gestational age reviewed including pertinent Checklist items. Discussed condition, tests and care plan including RBA. Problem list updated.   A/P:  Marizol was seen today for prenatal care.    Diagnoses and all orders for this visit:    Encounter for supervision of other normal pregnancy  -     Group B strep PCR        Carlos Frost MD

## 2017-05-15 ENCOUNTER — PRENATAL OFFICE VISIT (OUTPATIENT)
Dept: OBGYN | Facility: CLINIC | Age: 27
End: 2017-05-15
Payer: COMMERCIAL

## 2017-05-15 VITALS
SYSTOLIC BLOOD PRESSURE: 112 MMHG | HEART RATE: 82 BPM | BODY MASS INDEX: 23 KG/M2 | WEIGHT: 134 LBS | DIASTOLIC BLOOD PRESSURE: 69 MMHG

## 2017-05-15 DIAGNOSIS — Z34.80 ENCOUNTER FOR SUPERVISION OF OTHER NORMAL PREGNANCY: ICD-10-CM

## 2017-05-15 PROCEDURE — 99207 ZZC PRENATAL VISIT: CPT | Performed by: OBSTETRICS & GYNECOLOGY

## 2017-05-15 NOTE — PATIENT INSTRUCTIONS
If you have any questions regarding your visit, Please contact your care team.     [employer] Access Services: [phone]    [facility] CLINIC HOURS TELEPHONE NUMBER   HAYLEY Feng-    Roxanna Beltran-BLAISE Rodríguez-Medical Assistant   Monday-[place]  8:00a.m-4:45 p.m  Wednesday-[place] 8:00a.m-4:45 p.m.  Thursday-[place]  8:00a.m-4:45 p.m.  Friday-[place]  8:00a.m-4:45 p.m. [facility]  [address]  [place], [place] [address] [phone] ask [facility]  [phone] Fax  Imaging [phone]    [facility] Labor and Delivery  [address] [place] [address] Dr.  [place], [place] [address] [phone]    [facility]  [address] [place] [address]  [phone] ask [facility]  [phone] Fax  Imaging [phone]     Urgent Care locations:    [place]        [place] Monday-Friday  5 pm - 9 pm  Saturday and Sunday   9 am - 5 pm    Monday-Friday   11 am - 9 pm  Saturday and Sunday   9 am - 5 pm   [phone]       If you need a medication refill, please contact your pharmacy. Please allow 3 business days for your refill to be completed.  As always, Thank you for trusting us with your healthcare needs!

## 2017-05-15 NOTE — NURSING NOTE
"Chief Complaint   Patient presents with     Prenatal Care     OBV 37w6d       Initial /69 (BP Location: Right arm, Patient Position: Chair, Cuff Size: Adult Regular)  Pulse 82  Wt 60.8 kg (134 lb)  LMP 08/23/2016  Breastfeeding? No  BMI 23 kg/m2 Estimated body mass index is 23 kg/(m^2) as calculated from the following:    Height as of 1/4/17: 1.626 m (5' 4\").    Weight as of this encounter: 60.8 kg (134 lb).  Medication Reconciliation: complete   Anne Rasheed CMA      "

## 2017-05-15 NOTE — MR AVS SNAPSHOT
After Visit Summary   5/15/2017    Marizol Craig    MRN: [id number]           Patient Information     Date Of Birth          1990        Visit Information        Provider Department      5/15/2017 3:15 PM Carlos Frost MD [facility]        Today's Diagnoses     Encounter for supervision of other normal pregnancy          Care Instructions                                                        If you have any questions regarding your visit, Please contact your care team.     [facility] Access Services: [phone]    [facility] CLINIC HOURS TELEPHONE NUMBER   Carlos Frost M.D.      Zenaida-    Roxanna Beltran-BLAISE Rodríguez-Medical Assistant   Monday-[place]  8:00a.m-4:45 p.m  Wednesday-[place] 8:00a.m-4:45 p.m.  Thursday-[place]  8:00a.m-4:45 p.m.  Friday-[place]  8:00a.m-4:45 p.m. [facility]  [address]  [place], [place] [address] [phone] ask for [facility]  [phone] Fax  Imaging [phone]    [facility] Labor and Delivery  [address]  [place], [place] [address] [phone]    [facility]  [address] [facility] [place] [address]  [phone] ask [facility]  [phone] Fax  Imaging [phone]     Urgent Care locations:    [facility] Monday-Friday  5 pm - 9 pm  Saturday and Sunday   9 am - 5 pm    Monday-Friday   11 am - 9 pm  Saturday and Sunday   9 am - 5 pm   [phone]       If you need a medication refill, please contact your pharmacy. Please allow 3 business days for your refill to be completed.  As always, Thank you for trusting us with your healthcare needs!          Follow-ups after your visit        Who to contact     If you have questions or need follow up information about today's clinic visit or your schedule please contact [facility] directly at [phone].  Normal  "or non-critical lab and imaging results will be communicated to you by MyChart, letter or phone within 4 business days after the clinic has received the results. If you do not hear from us within 7 days, please contact the clinic through [employer] or phone. If you have a critical or abnormal lab result, we will notify you by phone as soon as possible.  Submit refill requests through [employer] or call your pharmacy and they will forward the refill request to us. Please allow 3 business days for your refill to be completed.          Additional Information About Your Visit        [employer] Information     [employer] lets you send messages to your doctor, view your test results, renew your prescriptions, schedule appointments and more. To sign up, go to [email or web address] . Click on \"Log in\" on the left side of the screen, which will take you to the Welcome page. Then click on \"Sign up Now\" on the right side of the page.     You will be asked to enter the access code listed below, as well as some personal information. Please follow the directions to create your username and password.     Your access code is: [email or web address]  Expires: 2017  2:55 PM     Your access code will  in 90 days. If you need help or a new code, please call your [place] clinic or [phone].        Care EveryWhere ID     This is your Care EveryWhere ID. This could be used by other organizations to access your [place] medical records  [id number]        Your Vitals Were     Pulse Last Period Breastfeeding? BMI (Body Mass Index)          82 2016 No 23 kg/m2         Blood Pressure from Last 3 Encounters:   05/15/17 112/69   17 121/77   17 110/68    Weight from Last 3 Encounters:   05/15/17 60.8 kg (134 lb)   17 59 kg (130 lb)   17 58.5 kg (129 lb)              Today, you had the following     No orders found for display       Primary Care Provider    [facility]       No address on file   "      Thank you!     Thank you for choosing [facility]  for your care. Our goal is always to provide you with excellent care. Hearing back from our patients is one way we can continue to improve our services. Please take a few minutes to complete the written survey that you may receive in the mail after your visit with us. Thank you!             Your Updated Medication List - Protect others around you: Learn how to safely use, store and throw away your medicines at www.disposemymeds.org.          This list is accurate as of: 5/15/17  3:29 PM.  Always use your most recent med list.                   Brand Name Dispense Instructions for use    PRENATAL PO

## 2017-05-16 NOTE — PROGRESS NOTES
No signif signs, symptoms or concerns. Advice appropriate to gestational age reviewed including pertinent Checklist items. Discussed condition, tests and care plan including RBA. Problem list updated.   A/P:  Marizol was seen today for prenatal care.    Diagnoses and all orders for this visit:    Encounter for supervision of other normal pregnancy        Carlos Frost MD

## 2017-05-22 ENCOUNTER — TELEPHONE (OUTPATIENT)
Dept: NURSING | Facility: CLINIC | Age: 27
End: 2017-05-22

## 2017-05-22 ENCOUNTER — TELEPHONE (OUTPATIENT)
Dept: OBGYN | Facility: CLINIC | Age: 27
End: 2017-05-22

## 2017-05-22 DIAGNOSIS — Z34.80 ENCOUNTER FOR SUPERVISION OF OTHER NORMAL PREGNANCY: ICD-10-CM

## 2017-05-22 NOTE — TELEPHONE ENCOUNTER
Patient called reporting contractions.  I answered the phone and was disconnected from patient.  Called patient back but had to leave a message.  I called her again and she told me she has already spoken with a nurse.  I am assuming it was FNA but there is no documentation.  Patient has had irregular contractions that are not painful.  Some are very far apart and others closer together but nothing consistent.  She denied leaking fluids or vaginal bleeding.  Baby is active. She will continue to monitor and call if they become more regular or painful.  Angelica Ventura RN

## 2017-05-22 NOTE — TELEPHONE ENCOUNTER
"Call Type: Triage Call    Presenting Problem: \"I think I am in labor, I will be 39 weeks. But I  am not sure what I am looking for as far as contractions go. With my  first my water broke, then once in the hospital I don't remember the  contractions. These started this am. But they are very irregular, I  am not able to time them. I am also having low back aches that feel  like a \"zing\" when I am having the contractions. \" Denies leakage of  fluids, denies bleeding and baby is active. Triaged and went over  contractions and how to monitor them and when to call back. Patient  agrees and will mointor and call back as needed.  Triage Note:  Guideline Title: Pregnancy: Signs of Labor, 37 Weeks or Greater  Recommended Disposition: Provide Home/Self Care  Original Inclination: Wanted to speak with a nurse  Override Disposition:  Intended Action: Follow advice given  Physician Contacted: No  Low backache AND irregular contractions ?  YES  Contractions different from previous [place] contractions ? NO  Low backache AND regular contractions ? NO  Anxious mother or partner AND irregular or inconsistent contractions ? NO  Sudden gush or trickle of fluid from the vagina ? NO  First childbirth with regular contractions for 1 hour and contractions are  feeling stronger and closer together. ? NO  New or worsening signs and symptoms that may indicate shock ? NO  Feeling of baby coming or wanting to push (urge to bear down) ? NO  Umbilical cord or any part of the baby (head, bottom, arm or leg) at the opening  of the vagina ? NO  Gestation 20 to 37 weeks ? NO  Gush or leakage of green or green-tinged or port-wine colored fluid (reddish and  watery) from the vagina ? NO  Previous childbirth AND moderate intensity contractions less than 5 minutes apart  for 1 hour or history of rapid delivery (less than 6 hours of labor) ? NO  Decreased fetal movement (less than 10 kicks/movements within two hours or a  significant change in " usual pattern compared to previous days) ? NO  Unable to tolerate the pain of contractions ? NO  No relief between contractions ? NO  Continuous bright red vaginal bleeding for more than 15 minutes (more than  spotting) ? NO  Vaginal bleeding more than bloody show ? NO  Known high-risk pregnancy and any signs of labor ? NO   candidate (such as previous , known breech presentation, large  fetus/small pelvis, uterine abnormalities, HIV) and any signs of labor ? NO  Presence or history of herpes on the vulva, vagina, or perineum and any signs of  labor ? NO  Physician Instructions:  Care Advice: Call provider if symptoms worsen or new symptoms develop.  SYMPTOM / CONDITION MANAGEMENT  RECOGNIZING CONTRACTIONS:   - a contraction is a periodic hardening or  tightening of the uterus.  -  uterus will feel hard during a contraction  and soft again once the contraction is over.  TIMING CONTRACTIONS:   - Empty bladder.     - Lie tilted slightly towards  the side.  If positioned completely on side, may not be able to feel  contraction.    - Place fingertips on top of abdomen and feel for  tightening or hardening of the uterus.  The uterus will become soft again  when the contraction is over. - Use watch or clock with a second hand and  note the time between the beginning of one contraction to the beginning of  the next contraction and note how long the contraction lasts.    - Monitor  the duration and frequency of contractions for a full hour.  Monitor Fetal Kicks:  Usually starting at week 28, begin counting your  baby's movements in the following ways:  - Select a time each day,  preferably after a meal. Lie down on left side or sit with feet up.  - Note  time and begin counting number of kicks/movements.   - Call your provider  if there is a change in your baby's activity compared to previous days or  if kicks/movements are less than 10 within a 2-hour period.  Speak with provider immediately if: -  Contractions four times in 20 minutes  OR 8 contractions in an hour - Gush or leakage of fluid from the vagina -  Decreased fetal movement (less than 10 kicks per 2 hours or a noticeable  decrease in baby's activity compared to previous day) - Previous vaginal  birth after less than 6 hours of labor  Call  if any of these occur: profuse bright red vaginal bleeding  continuous (without relaxation) abdominal pain  the umbilical cord or any fetal part in vagina  bag of leonard coming through vagina  feeling of wanting to push or have a bowel movement.

## 2017-05-23 ENCOUNTER — PRENATAL OFFICE VISIT (OUTPATIENT)
Dept: OBGYN | Facility: CLINIC | Age: 27
End: 2017-05-23
Payer: COMMERCIAL

## 2017-05-23 VITALS
DIASTOLIC BLOOD PRESSURE: 71 MMHG | HEART RATE: 85 BPM | WEIGHT: 134.4 LBS | OXYGEN SATURATION: 99 % | TEMPERATURE: 98.5 F | BODY MASS INDEX: 23.07 KG/M2 | SYSTOLIC BLOOD PRESSURE: 122 MMHG

## 2017-05-23 DIAGNOSIS — Z34.80 ENCOUNTER FOR SUPERVISION OF OTHER NORMAL PREGNANCY: ICD-10-CM

## 2017-05-23 PROCEDURE — 99207 ZZC PRENATAL VISIT: CPT | Performed by: OBSTETRICS & GYNECOLOGY

## 2017-05-23 NOTE — PROGRESS NOTES
39w0d  No HA, visual changes, N/V etc.  Labor plan and warning s/s discussed. RTC 1 wk/prn.      ICD-10-CM    1. Encounter for supervision of other normal pregnancy Z34.80      CEPHAS AGBEH, MD.

## 2017-05-23 NOTE — PATIENT INSTRUCTIONS
If you have any questions regarding your visit, Please contact your care team.    [facility] CLINIC HOURS TELEPHONE NUMBER   Cosmes Agbeh, M.D.    Velma Dominguez- BLAISE Beltran - BLAISE Estevez -         Monday-[facility]  8:00 am - 5 pm  Tuesday- [facility]  8:00am- 5 pm  Wednesday- Off  Thursday- [facility]  8:00 am- 5 pm  Friday-[place]  8:00 am 5 pm [facility]  [address] [place] [address]  [phone] ask for [facility]    Imaging [phone]    [facility]  [address] [facility]  [place] Mor[place] [address] [phone]  Imaging [phone]     Urgent Care locations:    [facility] Saturday and Sunday   9 am - 5 pm    Monday-Friday   12 pm - 8 pm  Saturday and Sunday   9 am - 5 pm   [phone]       If you need a medication refill, please contact your pharmacy. Please allow 3 business days for your refill to be completed.  As always, Thank you for trusting us with your healthcare needs!      '

## 2017-05-23 NOTE — NURSING NOTE
"Chief Complaint   Patient presents with     Prenatal Care     39w       Initial /71  Pulse 85  Temp 98.5  F (36.9  C) (Oral)  Wt 61 kg (134 lb 6.4 oz)  LMP 08/23/2016  SpO2 99%  BMI 23.07 kg/m2 Estimated body mass index is 23.07 kg/(m^2) as calculated from the following:    Height as of 1/4/17: 1.626 m (5' 4\").    Weight as of this encounter: 61 kg (134 lb 6.4 oz).  Medication Reconciliation: complete   Amanda Schulte CMA      "

## 2017-05-24 ENCOUNTER — TELEPHONE (OUTPATIENT)
Dept: OBGYN | Facility: CLINIC | Age: 27
End: 2017-05-24

## 2017-05-24 DIAGNOSIS — Z34.80 ENCOUNTER FOR SUPERVISION OF OTHER NORMAL PREGNANCY: ICD-10-CM

## 2017-05-24 NOTE — TELEPHONE ENCOUNTER
"Phone call to patient. She stated she started having low back pain and she reported back labor with regular intervals from 1730 to 2300 and then stopped while she slept. She stated she had a few pains during the noc and then started about 0930 again today. Patient stated she is timing contractions and ranging from 10-20 mins lasting about 20 seconds at the longest. She stated pain is not severe. Patient had pain while on the phone and did not need to stop to breathe. Patient stated she is able to walk and move without difficulty. Baby is active. Had a scant amt of pinkish discharge but no fld leak. Reviewed cervical changes noted at appt yesterday. Gave patient guidelines for needing to be seen in [facility] & [facility]. Recommended she stay active, eat healthy, and rest prn as her younger child naps. Explained patient can call back later if needed. Patient appreciative of assistance and agreed to follow plan.   Patient called back and stated she got off the phone and felt she had to void. She stated she lost her mucous plug as it was \"all stringy and pink.\" Attempted to reassure patient that it sounds like that but does not necessarily mean that she will have the baby today. Patient verbalized understanding and stated she has a friend who lost her mucous plug 1-2 wks prior to delivery. Patient wanted to call with update. Ruby Polk RN, MARIAN         "

## 2017-05-24 NOTE — TELEPHONE ENCOUNTER
[facility] Call Center    Phone Message    Name of Caller: Marizol    Phone Number: Home number on file [phone] (home)    Best time to return call: Any    May a detailed message be left on voicemail: yes    Relation to patient: Self    Reason for Call: Other: patient has been having contractions for 2 days now and would like to speak with someone. Every time she calls our phones drop her call. Please advise.      Action Taken: Message routed to:  [facility] p [phone]

## 2017-05-25 ENCOUNTER — TELEPHONE (OUTPATIENT)
Dept: NURSING | Facility: CLINIC | Age: 27
End: 2017-05-25

## 2017-05-25 NOTE — TELEPHONE ENCOUNTER
"Call Type: Triage Call    Presenting Problem: 39 weeks pregnant, VALORIE 05/30/2017, delivering at  [place], Contractions started about 30 minutes ago. She has had  a total of 3 contractions. Denies vaginal bleeding or ROM. Continued  fetal movement. She says her contractions \"hurt really bad\", and are  more painful than a few days ago.  Triage Note:  Guideline Title: Pregnancy: Signs of Labor, 37 Weeks or Greater  Recommended Disposition: Provide Home/Self Care  Original Inclination: Wanted to speak with a nurse  Override Disposition:  Intended Action: Follow advice given  Physician Contacted: No  Low backache AND irregular contractions ?  YES  Contractions different from previous [place] contractions ? NO  Low backache AND regular contractions ? NO  Anxious mother or partner AND irregular or inconsistent contractions ? NO  Sudden gush or trickle of fluid from the vagina ? NO  First childbirth with regular contractions for 1 hour and contractions are  feeling stronger and closer together. ? NO  New or worsening signs and symptoms that may indicate shock ? NO  Feeling of baby coming or wanting to push (urge to bear down) ? NO  Umbilical cord or any part of the baby (head, bottom, arm or leg) at the opening  of the vagina ? NO  Gestation 20 to 37 weeks ? NO  Gush or leakage of green or green-tinged or port-wine colored fluid (reddish and  watery) from the vagina ? NO  Previous childbirth AND moderate intensity contractions less than 5 minutes apart  for 1 hour or history of rapid delivery (less than 6 hours of labor) ? NO  Decreased fetal movement (less than 10 kicks/movements within two hours or a  significant change in usual pattern compared to previous days) ? NO  Unable to tolerate the pain of contractions ? NO  No relief between contractions ? NO  Continuous bright red vaginal bleeding for more than 15 minutes (more than  spotting) ? NO  Vaginal bleeding more than bloody show ? NO  Known high-risk " pregnancy and any signs of labor ? NO   candidate (such as previous , known breech presentation, large  fetus/small pelvis, uterine abnormalities, HIV) and any signs of labor ? NO  Presence or history of herpes on the vulva, vagina, or perineum and any signs of  labor ? NO  Physician Instructions:  Care Advice: RECOGNIZING CONTRACTIONS:   - a contraction is a periodic  hardening or tightening of the uterus.  -  uterus will feel hard during a  contraction and soft again once the contraction is over.  TIMING CONTRACTIONS:   - Empty bladder.     - Lie tilted slightly towards  the side.  If positioned completely on side, may not be able to feel  contraction.    - Place fingertips on top of abdomen and feel for  tightening or hardening of the uterus.  The uterus will become soft again  when the contraction is over. - Use watch or clock with a second hand and  note the time between the beginning of one contraction to the beginning of  the next contraction and note how long the contraction lasts.    - Monitor  the duration and frequency of contractions for a full hour.  Monitor Fetal Kicks:  Usually starting at week 28, begin counting your  baby's movements in the following ways:  - Select a time each day,  preferably after a meal. Lie down on left side or sit with feet up.  - Note  time and begin counting number of kicks/movements.   - Call your provider  if there is a change in your baby's activity compared to previous days or  if kicks/movements are less than 10 within a 2-hour period.  Speak with provider immediately if: - Contractions four times in 20 minutes  OR 8 contractions in an hour - Gush or leakage of fluid from the vagina -  Decreased fetal movement (less than 10 kicks per 2 hours or a noticeable  decrease in baby's activity compared to previous day) - Previous vaginal  birth after less than 6 hours of labor

## 2017-05-25 NOTE — TELEPHONE ENCOUNTER
Call Type: Triage Call    Presenting Problem: Marizol states she cannot tolerate her contractions.  Requesting on call paged. 39 weeks pregnant, VALORIE 2017. On call  paged at [phone] to call patient at [phone]  Triage Note:  Guideline Title: Pregnancy: Signs of Labor, 37 Weeks or Greater  Recommended Disposition: Call Provider Immediately  Original Inclination: Wanted to speak with a nurse  Override Disposition:  Intended Action: Call PCP/HCP  Physician Contacted: No  Unable to tolerate the pain of contractions ?  YES  Sudden gush or trickle of fluid from the vagina ? NO  First childbirth with regular contractions for 1 hour and contractions are  feeling stronger and closer together. ? NO  New or worsening signs and symptoms that may indicate shock ? NO  Feeling of baby coming or wanting to push (urge to bear down) ? NO  Umbilical cord or any part of the baby (head, bottom, arm or leg) at the opening  of the vagina ? NO  Gestation 20 to 37 weeks ? NO  Gush or leakage of green or green-tinged or port-wine colored fluid (reddish and  watery) from the vagina ? NO  Previous childbirth AND moderate intensity contractions less than 5 minutes apart  for 1 hour or history of rapid delivery (less than 6 hours of labor) ? NO  Decreased fetal movement (less than 10 kicks/movements within two hours or a  significant change in usual pattern compared to previous days) ? NO  No relief between contractions ? NO  Continuous bright red vaginal bleeding for more than 15 minutes (more than  spotting) ? NO  Known high-risk pregnancy and any signs of labor ? NO   candidate (such as previous , known breech presentation, large  fetus/small pelvis, uterine abnormalities, HIV) and any signs of labor ? NO  Presence or history of herpes on the vulva, vagina, or perineum and any signs of  labor ? NO  Physician Instructions:  Care Advice:

## 2018-02-10 NOTE — MR AVS SNAPSHOT
After Visit Summary   5/23/2017    Marizol Craig    MRN: [id number]           Patient Information     Date Of Birth          1990        Visit Information        Provider Department      5/23/2017 4:00 PM Agbeh, Cephas Mawuena, MD [facility]        Today's Diagnoses     Encounter for supervision of other normal pregnancy          Care Instructions                                                           If you have any questions regarding your visit, Please contact your care team.    Women s Health CLINIC HOURS TELEPHONE NUMBER   Cephas Agbeh, M.D.    Velma Dominguez- BLAISE Beltran - RN    Zenaida -         Monday-[facility]  8:00 am - 5 pm  Tuesday- [facility]  8:00am- 5 pm  Wednesday- Off  Thursday- [facility]  8:00 am- 5 pm  Friday-[place]  8:00 am 5 pm [facility]  [address] [place] [address] [phone] ask for [facility] Clinic    Imaging [phone]    [facility]  [address] [place], [place] [address] [address] [phone]  Imaging [phone]     Urgent Care locations:    [facility] Saturday and Sunday   9 am - 5 pm    Monday-Friday   12 pm - 8 pm  Saturday and Sunday   9 am - 5 pm   [phone]       If you need a medication refill, please contact your pharmacy. Please allow 3 business days for your refill to be completed.  As always, Thank you for trusting us with your healthcare needs!      '          Follow-ups after your visit        Your next 10 appointments already scheduled     May 31, 2017  3:45 PM CDT   ESTABLISHED PRENATAL with Brice Blank MD   [facility] ([facility])    [address] [facility] [address] [phone]              Who to contact     If you have questions or need follow up information about today's clinic visit or your schedule please contact [facility]  "[facility] directly at [phone].  Normal or non-critical lab and imaging results will be communicated to you by [employer], letter or phone within 4 business days after the clinic has received the results. If you do not hear from us within 7 days, please contact the clinic through [employer] or phone. If you have a critical or abnormal lab result, we will notify you by phone as soon as possible.  Submit refill requests through [employer]xus or call your pharmacy and they will forward the refill request to us. Please allow 3 business days for your refill to be completed.          Additional Information About Your Visit        [employer] Information     kenxus lets you send messages to your doctor, view your test results, renew your prescriptions, schedule appointments and more. To sign up, go to [email or web address] . Click on \"Log in\" on the left side of the screen, which will take you to the Welcome page. Then click on \"Sign up Now\" on the right side of the page.     You will be asked to enter the access code listed below, as well as some personal information. Please follow the directions to create your username and password.     Your access code is: [email or web address]  Expires: 2017  2:55 PM     Your access code will  in 90 days. If you need help or a new code, please call your [facility] clinic or [phone].        Care EveryWhere ID     This is your Care EveryWhere ID. This could be used by other organizations to access your [facility] medical records  [id number]        Your Vitals Were     Pulse Temperature Last Period Pulse Oximetry BMI (Body Mass Index)       85 98.5  F (36.9  C) (Oral) 2016 99% 23.07 kg/m2        Blood Pressure from Last 3 Encounters:   17 122/71   05/15/17 112/69   17 121/77    Weight from Last 3 Encounters:   17 61 kg (134 lb 6.4 oz)   05/15/17 60.8 kg (134 lb)   17 59 kg (130 lb)              Today, you had the following     No orders found for display       " Primary Care Provider    [facility]       No address on file        Thank you!     Thank you for choosing [facility]  for your care. Our goal is always to provide you with excellent care. Hearing back from our patients is one way we can continue to improve our services. Please take a few minutes to complete the written survey that you may receive in the mail after your visit with us. Thank you!             Your Updated Medication List - Protect others around you: Learn how to safely use, store and throw away your medicines at www.disposemymeds.org.          This list is accurate as of: 5/23/17  4:10 PM.  Always use your most recent med list.                   Brand Name Dispense Instructions for use    PRENATAL PO             no concerns

## 2018-02-18 ENCOUNTER — NURSE TRIAGE (OUTPATIENT)
Dept: NURSING | Facility: CLINIC | Age: 28
End: 2018-02-18

## 2018-02-18 NOTE — TELEPHONE ENCOUNTER
Reason for Disposition    SEVERE (e.g., excruciating) throat pain     Pain at 9    Additional Information    Negative: Severe difficulty breathing (e.g., struggling for each breath, speaks in single words, stridor)    Negative: Sounds like a life-threatening emergency to the triager    Negative: [1] Diagnosed strep throat AND [2] taking antibiotic AND [3] symptoms continue    Negative: Throat culture results, call about    Negative: Productive cough is main symptom    Negative: Non-productive cough is main symptom    Negative: Hoarseness is main symptom    Negative: Runny nose is main symptom    Negative: [1] Drooling or spitting out saliva (because can't swallow) AND [2] normal breathing    Negative: Unable to open mouth completely    Negative: [1] Difficulty breathing AND [2] not severe    Negative: Fever > 104 F (40 C)    Negative: [1] Refuses to drink anything AND [2] for > 12 hours    Negative: [1] Drinking very little AND [2] dehydration suspected (e.g., no urine > 12 hours, very dry mouth, very lightheaded)    Negative: Patient sounds very sick or weak to the triager    Protocols used: SORE THROAT-ADULT-

## 2018-02-19 ENCOUNTER — OFFICE VISIT (OUTPATIENT)
Dept: FAMILY MEDICINE | Facility: CLINIC | Age: 28
End: 2018-02-19
Payer: COMMERCIAL

## 2018-02-19 VITALS
TEMPERATURE: 98.8 F | DIASTOLIC BLOOD PRESSURE: 63 MMHG | HEART RATE: 108 BPM | WEIGHT: 110.6 LBS | HEIGHT: 64 IN | OXYGEN SATURATION: 100 % | SYSTOLIC BLOOD PRESSURE: 104 MMHG | BODY MASS INDEX: 18.88 KG/M2

## 2018-02-19 DIAGNOSIS — R07.0 THROAT PAIN: Primary | ICD-10-CM

## 2018-02-19 DIAGNOSIS — J02.0 STREPTOCOCCAL PHARYNGITIS: ICD-10-CM

## 2018-02-19 LAB
DEPRECATED S PYO AG THROAT QL EIA: ABNORMAL
SPECIMEN SOURCE: ABNORMAL

## 2018-02-19 PROCEDURE — 99213 OFFICE O/P EST LOW 20 MIN: CPT | Performed by: NURSE PRACTITIONER

## 2018-02-19 PROCEDURE — 87880 STREP A ASSAY W/OPTIC: CPT | Performed by: NURSE PRACTITIONER

## 2018-02-19 RX ORDER — PENICILLIN V POTASSIUM 500 MG/1
500 TABLET, FILM COATED ORAL 2 TIMES DAILY
Qty: 20 TABLET | Refills: 0 | Status: SHIPPED | OUTPATIENT
Start: 2018-02-19 | End: 2018-08-02

## 2018-02-19 NOTE — PROGRESS NOTES
"  SUBJECTIVE:   Marizol Craig is a 27 year old female who presents to clinic today for the following health issues:    RESPIRATORY SYMPTOMS      Duration: Since Friday    Description  sore throat, facial pain/pressure, chills, ear pain both and headache, generally feels weak    Severity: severe    Accompanying signs and symptoms: None    History (predisposing factors):  none    Precipitating or alleviating factors: None    Therapies tried and outcome:  Ibuprofen  Daughter has URI.  Patient is solely breastfeeding.      Problem list and histories reviewed & adjusted, as indicated.  Additional history: as documented    Patient Active Problem List   Diagnosis     Encounter for supervision of other normal pregnancy     Past Surgical History:   Procedure Laterality Date     HC CLOSED TX NASAL BONE FRACTURE W/O STABILIZATION  2005       Social History   Substance Use Topics     Smoking status: Never Smoker     Smokeless tobacco: Never Used     Alcohol use No     Family History   Problem Relation Age of Onset     Coronary Artery Disease Maternal Grandfather          No current outpatient prescriptions on file.     BP Readings from Last 3 Encounters:   02/19/18 104/63   05/23/17 122/71   05/15/17 112/69    Wt Readings from Last 3 Encounters:   02/19/18 110 lb 9.6 oz (50.2 kg)   05/23/17 134 lb 6.4 oz (61 kg)   05/15/17 134 lb (60.8 kg)                    Reviewed and updated as needed this visit by clinical staff  Tobacco  Allergies  Meds  Med Hx  Surg Hx  Fam Hx  Soc Hx      Reviewed and updated as needed this visit by Provider         ROS:  Constitutional, HEENT, cardiovascular, pulmonary, gi and gu systems are negative, except as otherwise noted.    OBJECTIVE:     /63 (BP Location: Left arm, Patient Position: Chair, Cuff Size: Adult Regular)  Pulse 108  Temp 98.8  F (37.1  C) (Oral)  Ht 5' 4\" (1.626 m)  Wt 110 lb 9.6 oz (50.2 kg)  SpO2 100%  BMI 18.98 kg/m2  Body mass index is 18.98 kg/(m^2).  GENERAL: " "healthy, alert and no distress  EYES: Eyes grossly normal to inspection, PERRL and conjunctivae and sclerae normal  HENT: ear canals and TM's normal, nose and mouth without ulcers or lesions  NECK: no adenopathy, no asymmetry, masses, or scars and thyroid normal to palpation  RESP: lungs clear to auscultation - no rales, rhonchi or wheezes  CV: regular rate and rhythm, normal S1 S2, no S3 or S4, no murmur, click or rub, no peripheral edema and peripheral pulses strong  ABDOMEN: soft, nontender, no hepatosplenomegaly, no masses and bowel sounds normal  MS: no gross musculoskeletal defects noted, no edema  SKIN: no suspicious lesions or rashes  PSYCH: mentation appears normal, affect normal/bright    Diagnostic Test Results:  No results found for this or any previous visit (from the past 24 hour(s)).    ASSESSMENT/PLAN:         BMI:   Estimated body mass index is 18.98 kg/(m^2) as calculated from the following:    Height as of this encounter: 5' 4\" (1.626 m).    Weight as of this encounter: 110 lb 9.6 oz (50.2 kg).   Weight management plan noted, stable and monitoring      1. Throat pain    - Rapid strep screen    2. Streptococcal pharyngitis    Rapid strep screen was positive.  Antibiotics indicated, see orders.    Patient was warned she is contagious until she has been on antibiotics for 24 hours.   Encouraged good hydration.  OTC analgesic and saline gargles recommended.  Recheck if not improving as expected.      - penicillin V potassium (VEETID) 500 MG tablet; Take 1 tablet (500 mg) by mouth 2 times daily  Dispense: 20 tablet; Refill: 0    3.  [place] feeding status of mother  Ok to continue to breast feed while taking Pen VK.    See Patient Instructions      SUSHIL Ramirez [facility]  "

## 2018-02-19 NOTE — MR AVS SNAPSHOT
After Visit Summary   2/19/2018    Marizol Craig    MRN: [id number]           Patient Information     Date Of Birth          1990        Visit Information        Provider Department      2/19/2018 11:40 AM Marika Hahn APRN CNP [facility]        Today's Diagnoses     Throat pain    -  1    Streptococcal pharyngitis        Breast feeding status of mother          Care Instructions    At [facility], we strive to deliver an exceptional experience to you, every time we see you.  If you receive a survey in the mail, please send us back your thoughts. We really do value your feedback.    Based on your medical history, these are the current health maintenance/preventive care services that you are due for (some may have been done at this visit.)  Health Maintenance Due   Topic Date Due     HPV IMMUNIZATION (1 of 3 - Female 3 Dose Series) 12/17/2003     TETANUS IMMUNIZATION (SYSTEM ASSIGNED)  12/17/2010     PAP Q3 YR  06/09/2017     INFLUENZA VACCINE (SYSTEM ASSIGNED)  09/01/2017         Suggested websites for health information:  Www.InsideMaps.[employer] : Up to date and easily searchable information on multiple topics.  Www.medlineplus.gov : medication info, interactive tutorials, watch real surgeries online  Www.familydoctor.org : good info from the Academy of Family Physicians  Www.cdc.gov : public health info, travel advisories, epidemics (H1N1)  Www.aap.org : children's health info, normal development, vaccinations  Www.health.state.[place].us : [place] dept of health, public health issues in [place], N1N1    Your care team:                            Family Medicine Internal Medicine   MD Sonu Rojas MD Shantel Branch-Fleming, MD Katya Georgiev PA-C Nam Ho, MD Pediatrics   YIMI Blanca, MD Elen Bianchi CNP, MD Deborah Mielke, MD Kim Thein, APRN CNP      Clinic hours: Monday -  Thursday 7 am-7 pm; Fridays 7 am-5 pm.   Urgent care: Monday - Friday 11 am-9 pm; Saturday and Sunday 9 am-5 pm.  Pharmacy : Monday -Thursday 8 am-8 pm; Friday 8 am-6 pm; Saturday and Sunday 9 am-5 pm.     Clinic: [phone]   Pharmacy: [phone]    Pharyngitis: Strep (Confirmed)    You have had a positive test for strep throat. Strep throat is a contagious illness. It is spread by coughing, kissing or by touching others after touching your mouth or nose. Symptoms include throat pain that is worse with swallowing, aching all over, headache, and fever. It is treated with antibiotic medicine. This should help you start to feel better in 1 to 2 days.  Home care    Rest at home. Drink plenty of fluids to you won't get dehydrated.    No work or school for the first 2 days of taking the antibiotics. After this time, you will not be contagious. You can then return to school or work if you are feeling better.     Take antibiotic medicine for the full 10 days, even if you feel better. This is very important to ensure the infection is treated. It is also important to prevent medicine-resistant germs from developing. If you were given an antibiotic shot, you don't need any more antibiotics.    You may use acetaminophen or ibuprofen to control pain or fever, unless another medicine was prescribed for this. Talk with your doctor before taking these medicines if you have chronic liver or kidney disease. Also talk with your doctor if you have had a stomach ulcer or GI bleeding.    Throat lozenges or sprays help reduce pain. Gargling with warm saltwater will also reduce throat pain. Dissolve 1/2 teaspoon of salt in 1 glass of warm water. This may be useful just before meals.     Soft foods are OK. Avoid salty or spicy foods.  Follow-up care  Follow up with your healthcare provider or our staff if you don't get better over the next week.  When to seek medical advice  Call your healthcare provider right away if any of these  "occur:    Fever of 100.4 F (38 C) or higher, or as directed by your healthcare provider    New or worsening ear pain, sinus pain, or headache    Painful lumps in the back of neck    Stiff neck    Lymph nodes getting larger or becoming soft in the middle    You can't swallow liquids or you can't open your mouth wide because of throat pain    Signs of dehydration. These include very dark urine or no urine, sunken eyes, and dizziness.    Trouble breathing or noisy breathing    Muffled voice    Rash  Date Last Reviewed: 4/13/2015 2000-2017 [employer] [address] [place] [address]. All rights reserved. This information is not intended as a substitute for professional medical care. Always follow your healthcare professional's instructions.                Follow-ups after your visit        Who to contact     If you have questions or need follow up information about today's clinic visit or your schedule please contact [facility] directly at [phone].  Normal or non-critical lab and imaging results will be communicated to you by Interface[employer]hart, letter or phone within 4 business days after the clinic has received the results. If you do not hear from us within 7 days, please contact the clinic through [employer] or phone. If you have a critical or abnormal lab result, we will notify you by phone as soon as possible.  Submit refill requests through [employer] or call your pharmacy and they will forward the refill request to us. Please allow 3 business days for your refill to be completed.          Additional Information About Your Visit        [employer] Information     [employer] lets you send messages to your doctor, view your test results, renew your prescriptions, schedule appointments and more. To sign up, go to [email or web address] . Click on \"Log in\" on the left side of the screen, which will take you to the Welcome page. Then click on \"Sign up Now\" on the right side of the page. " "    You will be asked to enter the access code listed below, as well as some personal information. Please follow the directions to create your username and password.     Your access code is: [email or web address]  Expires: 2018 12:16 PM     Your access code will  in 90 days. If you need help or a new code, please call your [facility] clinic or [phone].        Care EveryWhere ID     This is your Care EveryWhere ID. This could be used by other organizations to access your [facility] medical records  [id number]        Your Vitals Were     Pulse Temperature Height Pulse Oximetry BMI (Body Mass Index)       108 98.8  F (37.1  C) (Oral) 5' 4\" (1.626 m) 100% 18.98 kg/m2        Blood Pressure from Last 3 Encounters:   18 104/63   17 122/71   05/15/17 112/69    Weight from Last 3 Encounters:   18 110 lb 9.6 oz (50.2 kg)   17 134 lb 6.4 oz (61 kg)   05/15/17 134 lb (60.8 kg)              We Performed the Following     Rapid strep screen          Today's Medication Changes          These changes are accurate as of 18 12:16 PM.  If you have any questions, ask your nurse or doctor.               Start taking these medicines.        Dose/Directions    penicillin V potassium 500 MG tablet   Commonly known as:  VEETID   Used for:  Streptococcal pharyngitis   Started by:  Marika Hahn APRN CNP        Dose:  500 mg   Take 1 tablet (500 mg) by mouth 2 times daily   Quantity:  20 tablet   Refills:  0         Stop taking these medicines if you haven't already. Please contact your care team if you have questions.     PRENATAL PO   Stopped by:  Marika Hahn APRN CNP                Where to get your medicines      These medications were [place] [facility] [place] [address]  [address], [facility] [address]     Phone:  [phone]     penicillin V potassium 500 MG tablet                Primary Care Provider Office Phone # Fax #    [facility] " [facility] [phone]       [address]  [facility] [address]        Equal Access to Services     ROSE POP : Hadii aad ku hadajaycamila Soshashi, reglada shelbysilviaha, leonardta kajohnda sukh, shirley prater laDustinzackery monroy. So [facility] [phone].    ATENCIÓN: Si habla español, tiene a bray disposición servicios gratuitos de asistencia lingüística. Llame al [phone].    We comply with applicable federal civil rights laws and [place] laws. We do not discriminate on the basis of race, color, national origin, age, disability, sex, sexual orientation, or gender identity.            Thank you!     Thank you for choosing [facility]  for your care. Our goal is always to provide you with excellent care. Hearing back from our patients is one way we can continue to improve our services. Please take a few minutes to complete the written survey that you may receive in the mail after your visit with us. Thank you!             Your Updated Medication List - Protect others around you: Learn how to safely use, store and throw away your medicines at www.disposemymeds.org.          This list is accurate as of 2/19/18 12:16 PM.  Always use your most recent med list.                   Brand Name Dispense Instructions for use Diagnosis    penicillin V potassium 500 MG tablet    VEETID    20 tablet    Take 1 tablet (500 mg) by mouth 2 times daily    Streptococcal pharyngitis

## 2018-02-19 NOTE — PATIENT INSTRUCTIONS
At [facility], we strive to deliver an exceptional experience to you, every time we see you.  If you receive a survey in the mail, please send us back your thoughts. We really do value your feedback.    Based on your medical history, these are the current health maintenance/preventive care services that you are due for (some may have been done at this visit.)  Health Maintenance Due   Topic Date Due     HPV IMMUNIZATION (1 of 3 - Female 3 Dose Series) 12/17/2003     TETANUS IMMUNIZATION (SYSTEM ASSIGNED)  12/17/2010     PAP Q3 YR  06/09/2017     INFLUENZA VACCINE (SYSTEM ASSIGNED)  09/01/2017         Suggested websites for health information:  Www.[employer].org : Up to date and easily searchable information on multiple topics.  Www.medlineplus.gov : medication info, interactive tutorials, watch real surgeries online  Www.familydoctor.org : good info from the Academy of Family Physicians  Www.cdc.gov : public health info, travel advisories, epidemics (H1N1)  Www.aap.org : children's health info, normal development, vaccinations  Www.health.[facility].[place].us : [place] dept of health, public health issues in [place], N1N1    Your care team:                            Family Medicine Internal Medicine   MD Sonu Rojas MD Shantel Branch-Fleming, MD Katya Georgiev PA-C Nam Ho, MD Pediatrics   YIMI Blanca, BRUCE Haji APRN CNP   MD Elen Rees MD Deborah Mielke, MD Kim Thein, APRN [facility]      Clinic hours: Monday - Thursday 7 am-7 pm; Fridays 7 am-5 pm.   Urgent care: Monday - Friday 11 am-9 pm; Saturday and Sunday 9 am-5 pm.  Pharmacy : Monday -Thursday 8 am-8 pm; Friday 8 am-6 pm; Saturday and Sunday 9 am-5 pm.     Clinic: [phone]   Pharmacy: [phone]    Pharyngitis: Strep (Confirmed)    You have had a positive test for strep throat. Strep throat is a contagious illness. It is spread by coughing, kissing or by touching others  after touching your mouth or nose. Symptoms include throat pain that is worse with swallowing, aching all over, headache, and fever. It is treated with antibiotic medicine. This should help you start to feel better in 1 to 2 days.  Home care    Rest at home. Drink plenty of fluids to you won't get dehydrated.    No work or school for the first 2 days of taking the antibiotics. After this time, you will not be contagious. You can then return to school or work if you are feeling better.     Take antibiotic medicine for the full 10 days, even if you feel better. This is very important to ensure the infection is treated. It is also important to prevent medicine-resistant germs from developing. If you were given an antibiotic shot, you don't need any more antibiotics.    You may use acetaminophen or ibuprofen to control pain or fever, unless another medicine was prescribed for this. Talk with your doctor before taking these medicines if you have chronic liver or kidney disease. Also talk with your doctor if you have had a stomach ulcer or GI bleeding.    Throat lozenges or sprays help reduce pain. Gargling with warm saltwater will also reduce throat pain. Dissolve 1/2 teaspoon of salt in 1 glass of warm water. This may be useful just before meals.     Soft foods are OK. Avoid salty or spicy foods.  Follow-up care  Follow up with your healthcare provider or our staff if you don't get better over the next week.  When to seek medical advice  Call your healthcare provider right away if any of these occur:    Fever of 100.4 F (38 C) or higher, or as directed by your healthcare provider    New or worsening ear pain, sinus pain, or headache    Painful lumps in the back of neck    Stiff neck    Lymph nodes getting larger or becoming soft in the middle    You can't swallow liquids or you can't open your mouth wide because of throat pain    Signs of dehydration. These include very dark urine or no urine, sunken eyes, and  dizziness.    Trouble breathing or noisy breathing    Muffled voice    Rash  Date Last Reviewed: 4/13/2015 2000-2017 [employer]. [address] [place] [address], [place] [address]. All rights reserved. This information is not intended as a substitute for professional medical care. Always follow your healthcare professional's instructions.

## 2018-03-09 ENCOUNTER — TELEPHONE (OUTPATIENT)
Dept: FAMILY MEDICINE | Facility: CLINIC | Age: 28
End: 2018-03-09

## 2018-04-10 ENCOUNTER — TELEPHONE (OUTPATIENT)
Dept: FAMILY MEDICINE | Facility: CLINIC | Age: 28
End: 2018-04-10

## 2018-04-10 NOTE — LETTER
April 10, 2018    Marizol Craig  [address] [facility]  [place] [address]      Dear Marizol Craig,     In order to ensure we are providing the best quality care, we have reviewed your chart and see that you are due for:    Physical with pap smear: Pap smear is a screening test used to detect cervical cancer. It is recommended every three years for women 21 and older. The test should be done at least once every three years but women who are at greater risk for cervical cancer may need to have the test more often.    Please call the clinic at your earliest convenience to schedule an appointment. If you have had any of the screenings listed above at another facility, please call us so that we may update your chart.      Thank you for trusting us with your health care.    Sincerely,    [facility]/[phone]  [phone]

## 2018-04-10 NOTE — TELEPHONE ENCOUNTER
Panel Management Review      BP Readings from Last 1 Encounters:   02/19/18 104/63      Last Office Visit with this department: 3/9/2018    Fail List measure: PAP      Patient is due/failing the following:   PAP    Action needed:   Patient needs office visit for PHYSICAL.    Type of outreach:    Sent letter.    Questions for provider review:    None                                                                                                                                    Thelma Mojica MA      Chart routed to  .

## 2018-07-13 ENCOUNTER — TELEPHONE (OUTPATIENT)
Dept: OTOLARYNGOLOGY | Facility: CLINIC | Age: 28
End: 2018-07-13

## 2018-07-13 NOTE — TELEPHONE ENCOUNTER
Pt reports having a broken nose and having surgery for it when she was about 17 y/o. She is now having problems with her nose and is wanting a consult to see if there any surgical options at this. She thinks she had surgery in [place], but hasn't been able to locate any of her records.  Crooked, one nostril gets very swollen, hard to breath out of. Very uncomfortable. She is essentially asking for some guidance as to who to see what steps to take.   I advised that normally she would start with her PCP and they would evaluate if a referral was necessary, but at this point she could just peak with one our staff in our ENT clinic.    Called over and they will help get setup and get records if needed.    Ifeanyi Menchaca RN

## 2018-08-02 ENCOUNTER — OFFICE VISIT (OUTPATIENT)
Dept: OTOLARYNGOLOGY | Facility: CLINIC | Age: 28
End: 2018-08-02
Payer: COMMERCIAL

## 2018-08-02 DIAGNOSIS — J34.89 NASAL OBSTRUCTION: Primary | ICD-10-CM

## 2018-08-02 PROCEDURE — 92511 NASOPHARYNGOSCOPY: CPT | Performed by: OTOLARYNGOLOGY

## 2018-08-02 PROCEDURE — 99204 OFFICE O/P NEW MOD 45 MIN: CPT | Mod: 25 | Performed by: OTOLARYNGOLOGY

## 2018-08-02 RX ORDER — FLUTICASONE PROPIONATE 50 MCG
1-2 SPRAY, SUSPENSION (ML) NASAL DAILY
Qty: 1 BOTTLE | Refills: 3 | Status: SHIPPED | OUTPATIENT
Start: 2018-08-02 | End: 2019-09-18

## 2018-08-02 NOTE — LETTER
"    8/2/2018         RE: Marizol Craig  [address]  [place] [address]        Dear Colleague,    Thank you for referring your patient, Marizol Craig, to the [facility]. Please see a copy of my visit note below.        Consutation to evaluate nasal obstruction.  Today I had the pleasure of seeing Marizol Craig at the [facility] Clinic in the Department of Otolaryngology at [facility]. Patient called multiple clinics and hospitals and was unable to get any records.      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   -2008: nasal surgery to improve breathing and correct nasal deviation: including open rhinoplasty (columellar scar present) no auricular or chest scars from that surgery.   -2010: started to note recurrent nasal deviation to the right and nasal congestion.   -8/2/18: NOSE = 17, nasal endoscopy = no posterior cause for obstruction. Anterior right dorsal septal deviation present.     2) Nasal deformity from trauma s/p rhinoplasty in 2008 with recurrence of the dorsal deviation to the right.    Comorbidities: None  Pertinent medications: None  Photographs: [facility] consents signed August 2, 2018. 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  Care Checklist:   _Our staff will search for her operative report from 2008.   _Nasal steroid trial.  _Follow up in 6-8 weeks: After Steroid Spray Protocol (.wwnasalaftersteroidspray)  _Observe nasal function and nasal cycle.  _Operative plan: depending on intraoperative findings, the possible operative plan may include: REVISION septorhinoplasty, likely external approach, osteotomies, septoplasty, possible ear or rib cartilage " grafting, turbinate reduction.    Patient Counseling: Ms. Craig voiced understanding of the following instructions:    Nasal steroid trial: use the nasal steroid spray in each nostril two times a day (usually first thing in the morning and then before going to bed). Please use these medications for at least 6 weeks. Let Dr. Flor know at your follow up visit whether the steroid nasal spray improved your nasal breathing.     Observe your nasal cycle: The nasal cycle refers to the normal cycle of the nose with alternating stuffiness between the sides of the nose. Most people experience this alternating stuffiness but never notice any troubles breathing through their nose because one side is always open and able to breathe comfortably. For example, a person at the beginning of the day may notice their left nasal passage is stuffy but they breathe comfortably through the right nasal passage. Later in the day, their nose would have  cycled  and now their alternate nostril- the right nostril- is stuffy and the left nostril is open and they are breathing comfortably through the left side. Even later in the day, the stuffiness may have switched back to the left side.  This alternating stuffiness between the sides of the nose known as the nasal cycle and it is normal. This is important to understand because breathing through both nostrils at the same time is often not possible even after surgery.  The best breathing result possible may be that you breathe comfortably through one side of your nose at a time and it switches back and forth between the sides. Please observe your nasal breathing and see how this fits with the normal nasal cycle.     Please contact us if you have questions or if we can be of service.      MEDICAL DECISION MAKING:   Nasal airway obstruction. This likely results from a combination of severe septal deviation, nasal valve collapse due to her external nasal deformity, and turbinate hypertrophy. I  recommend a course of medical therapy using steroid nasal sprays. If this proves ineffective, I would recommend the nasal surgery outlined in the care checklist. I have asked Ms. Craig to follow up with me in 6-8 weeks to update me on her response to medical therapy.  Traumatic Nasal Deformity: this can only be addressed with a rhinoplasty and we will discuss this further at our next visit.    It has been a pleasure to participate in the care of Ms. Craig.  Thank you for this kind referral.     Sincerely,    Markus Flor MD    Division of Facial Plastic and Reconstructive Surgery,   Department of Otolaryngology  [facility]   ______________________________________________________________________        HISTORY OF PRESENT ILLNESS and NASAL QUESTIONAIRRE:  As you know, Ms. Craig is an 27 year old-year-old female who presents with nasal obstruction.     She first noticed nasal obstruction or congestion When it was originally broken it was not a problem, but it has gotten worse over time. Had it repaired 10 years ago, never had any follow ups, has been getting worse again.      Is the congestion worse on one side or the other? right.  Provider- Is the congestion worse on one side or the other? right.    Is the congestion constant or intermittent? intermittent.  Provider- Is the congestion constant or intermittent? Always there but worse in the morning.    No. Do you have a history of allergies?   No. Do you have allergic symptoms like sneezing, runny nose, watery eyes?   No. Are some seasons worse for your breathing than others?      Yes - 10 years, unknown provider - no follow ups. History of prior nasal surgery: present.   Provider- History of prior nasal surgery: see clinical summary above.     Yes - Parents told her that is had been broken as a young child. History of nasal trauma: present.   Provider- History of nasal trauma: present.     Preferred side to sleep: left.  "    No. Have you tried any nasal medications or nasal sprays?   Provider- Have you tried any nasal medications or nasal sprays? No.    No. Have you tried breathe-right strips?    Yes - \"Obviously looks crooked\" . Do you have any concerns about the appearance of your nose? (If any concerns, document patient's words)  Provider- Do you have any concerns about the appearance of your nose? Yes - crooked nose to the right.      Nasal Obstruction Symptom Evaluation (NOSE QUESTIONNAIRE):   [Administer the paper survey to the patient called the Nasal Obstruction Symptom Evaluation (NOSE QUESTIONNAIRE), copy the results below:]    Nasal congestion or stuffiness is a fairly bad problem.   Nasal blockage or obstruction is a severe problem.   Trouble breathing through her nose is a severe problem.   Trouble sleeping is a moderate problem.   Inability to get enough air through her nose during exercise or exertion is a severe problem.           REVIEW OF SYSTEMS: a 12 system review was performed by the patient care staff:    Do you currently have or have you ever had in the past:    No. Complications with sedation or anesthesia.  No. Use blood thinners. No   No. Any heart problems.   No. Chest pain.   No. A pacemaker.  No. Problems with excessive bleeding or a bleeding disorder.  No. Problems with blood clots or a clotting disorder.   No. Sleep apnea or sleep with a CPAP machine.       No. Excessive scarring.   No. Night sweats.   No. Fevers.   Yes - \"Sometimes yes - I need to eat all the time or I get dizzy\". Double vision.   No. Vision loss.   No. Snoring.   Yes - \"Severe\". Difficulty breathing through your nose.   No. Runny nose.   No. Sneezing.   No. Itchy eyes.   No. Itchy throat.   No. Face pain.   No. Face weakness.   No. Face numbness.   No. Difficulty swallowing.   No. Pain with swallowing.,   No. Difficulty hearing or hearing loss.   No. Difficulty urinating.   No. Anxiety.   No. Depression.     FAMILY HISTORY:  No. " Family history of excessive bleeding or a bleeding disorder?    No. Family history of blood clots or a clotting disorder?    Family History   Problem Relation Age of Onset     Coronary Artery Disease Maternal Grandfather        PAST MEDICAL HISTORY:   Past Medical History:   Diagnosis Date     NO ACTIVE PROBLEMS (aka NONE)        PAST SURGICAL HISTORY:   Past Surgical History:   Procedure Laterality Date     HC CLOSED [place] NASAL BONE FRACTURE W/O STABILIZATION  2005       SOCIAL HISTORY:   Social History   Substance Use Topics     Smoking status: Never Smoker     Smokeless tobacco: Never Used     Alcohol use No       ALLERGIES: Review of patient's allergies indicates no known allergies.    MEDICATIONS:   Current Outpatient Prescriptions   Medication Sig Dispense Refill     penicillin V potassium (VEETID) 500 MG tablet Take 1 tablet (500 mg) by mouth 2 times daily 20 tablet 0         Patient Care Staff Signature: Pennie Castillo CMA (AAMA)    _______________________________________________      Provider- Review of systems, FH, PMH, PSH, SH, ALL, and Medications taken by the patient care staff was reviewed by me: Markus Flor        PHYSICAL EXAMINATION:    NOSE:   Nasal skin quality: thin    Frontal view:     Dorsum frontal view deviate right.      Nostril show: asymmetric with an elevated and notched left nostril margin    Lateral view:     Radix position: Normal.    Rhinion: ski slope profile.    Projection: appropriate.    Rotation:appropriate.    Alar sidewall: normal contour.    Alar-columellar relationship: normal.    Palpation:     Dorsal palpation: Irregularities present on palpation.    Nasal bone length: Normal.     Tip support: average.    Palpation of the septum reveals the caudal septum located on the nasal spine.    Base view:    Tip shape: tip christi present    External nasal valve: static valve collapse present right side    Lateral crura: normal.    Medial crura: normal.    Columella: deviate to the  right    Intranasal exam/anterior rhinoscopy:     Septum deviation right in the region of internal and external nasal valve creating nasal airway obstruction on that side.    No septal perforation anteriorly.      Internal nasal valve: narrowed on the right side.    Turbinate hypertrophy was observed.     No polyps or purulence.    Modified Dayday maneuver: the patient reports a significant improvement in breathing during lateralization of the right nasal sidewall in the region of the  internal nasal valve.      Remainder of physical exam:   CONSTITUTIONAL:  No apparent distress.  Pleasant affect.  Normal ability to communicate.  CRANIOFACIAL:  Normocephalic, atraumatic.  SKIN:  No lesions or scars.  EYES:  Extraocular muscles intact.  NOSE: the nasal exam documentation above was scribed by our patient care staff as I was performing the examination.  EARS:  Normal auricles.   ORAL CAVITY AND OROPHARYNX: no lesions on inspection.  NECK:  The parotid is soft, without masses.  Supple laryngeal landmarks.  LYMPHATIC:  No palpable lymphadenopathy.  CARDIOVASCULAR:  Carotid pulses are palpable bilaterally.  NEUROLOGIC:  Facial nerve intact.  RESPIRATORY:  Normal respiratory effort.  No stridor.  Voice strong.        Procedure: Nasal endoscopy.   Indications: examine for posterior nasal cavity causes of nasal obstruction.  The nose was topically decongested and anesthestized with a compounded spray of 3% lidocaine and 0.25% phenylephrine through both nostrils. The nasal endoscope was passed under endoscopic vision. Normal middle turbinate architecture. No polyps or purulence. No septal perforation. No significant adenoid tissue. Eustachian tube orifices clear. No masses or lesions.        Again, thank you for allowing me to participate in the care of your patient.        Sincerely,        Markus Flor MD

## 2018-08-02 NOTE — PROGRESS NOTES
"    Consutation to evaluate nasal obstruction.  Today I had the pleasure of seeing Marizol Craig at the Facial Plastic and Reconstructive Surgery Clinic in the Department of Otolaryngology at [facility]. Patient called multiple clinics and hospitals and was unable to get any records.      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   -2008: nasal surgery to improve breathing and correct nasal deviation: including open rhinoplasty (columellar scar present) no auricular or chest scars from that surgery.   -2010: started to note recurrent nasal deviation to the right and nasal congestion.   -8/2/18: NOSE = 17, nasal endoscopy = no posterior cause for obstruction. Anterior right dorsal septal deviation present.     2) Nasal deformity from trauma s/p rhinoplasty in 2008 with recurrence of the dorsal deviation to the right.    Comorbidities: None  Pertinent medications: None  Photographs: [facility] consents signed August 2, 2018. 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  Care Checklist:   _Our staff will search for her operative report from 2008.   _Nasal steroid trial.  _Follow up in 6-8 weeks: After Steroid Spray Protocol (.wwnasalaftersteroidspray)  _Observe nasal function and nasal cycle.  _Operative plan: depending on intraoperative findings, the possible operative plan may include: REVISION septorhinoplasty, likely external approach, osteotomies, septoplasty, possible ear or rib cartilage grafting, turbinate reduction.    Patient Counseling: Ms. Craig voiced understanding of the following instructions:    Nasal steroid trial: use the nasal steroid spray in each nostril two times a day (usually first thing in the morning " and then before going to bed). Please use these medications for at least 6 weeks. Let Dr. Flor know at your follow up visit whether the steroid nasal spray improved your nasal breathing.     Observe your nasal cycle: The nasal cycle refers to the normal cycle of the nose with alternating stuffiness between the sides of the nose. Most people experience this alternating stuffiness but never notice any troubles breathing through their nose because one side is always open and able to breathe comfortably. For example, a person at the beginning of the day may notice their left nasal passage is stuffy but they breathe comfortably through the right nasal passage. Later in the day, their nose would have  cycled  and now their alternate nostril- the right nostril- is stuffy and the left nostril is open and they are breathing comfortably through the left side. Even later in the day, the stuffiness may have switched back to the left side.  This alternating stuffiness between the sides of the nose known as the nasal cycle and it is normal. This is important to understand because breathing through both nostrils at the same time is often not possible even after surgery.  The best breathing result possible may be that you breathe comfortably through one side of your nose at a time and it switches back and forth between the sides. Please observe your nasal breathing and see how this fits with the normal nasal cycle.     Please contact us if you have questions or if we can be of service.      MEDICAL DECISION MAKING:   Nasal airway obstruction. This likely results from a combination of severe septal deviation, nasal valve collapse due to her external nasal deformity, and turbinate hypertrophy. I recommend a course of medical therapy using steroid nasal sprays. If this proves ineffective, I would recommend the nasal surgery outlined in the care checklist. I have asked Ms. Craig to follow up with me in 6-8 weeks to update me on her  "response to medical therapy.  Traumatic Nasal Deformity: this can only be addressed with a rhinoplasty and we will discuss this further at our next visit.    It has been a pleasure to participate in the care of Ms. Craig.  Thank you for this kind referral.     Sincerely,    Markus Flor MD    Division of Facial Plastic and Reconstructive Surgery,   Department of Otolaryngology  [facility]   ______________________________________________________________________        HISTORY OF PRESENT ILLNESS and NASAL QUESTIONAIRRE:  As you know, Ms. Craig is an 27 year old-year-old female who presents with nasal obstruction.     She first noticed nasal obstruction or congestion When it was originally broken it was not a problem, but it has gotten worse over time. Had it repaired 10 years ago, never had any follow ups, has been getting worse again.      Is the congestion worse on one side or the other? right.  Provider- Is the congestion worse on one side or the other? right.    Is the congestion constant or intermittent? intermittent.  Provider- Is the congestion constant or intermittent? Always there but worse in the morning.    No. Do you have a history of allergies?   No. Do you have allergic symptoms like sneezing, runny nose, watery eyes?   No. Are some seasons worse for your breathing than others?      Yes - 10 years, unknown provider - no follow ups. History of prior nasal surgery: present.   Provider- History of prior nasal surgery: see clinical summary above.     Yes - Parents told her that is had been broken as a young child. History of nasal trauma: present.   Provider- History of nasal trauma: present.     Preferred side to sleep: left.     No. Have you tried any nasal medications or nasal sprays?   Provider- Have you tried any nasal medications or nasal sprays? No.    No. Have you tried breathe-right strips?    Yes - \"Obviously looks crooked\" . Do you have any concerns about " "the appearance of your nose? (If any concerns, document patient's words)  Provider- Do you have any concerns about the appearance of your nose? Yes - crooked nose to the right.      Nasal Obstruction Symptom Evaluation (NOSE QUESTIONNAIRE):   [Administer the paper survey to the patient called the Nasal Obstruction Symptom Evaluation (NOSE QUESTIONNAIRE), copy the results below:]    Nasal congestion or stuffiness is a fairly bad problem.   Nasal blockage or obstruction is a severe problem.   Trouble breathing through her nose is a severe problem.   Trouble sleeping is a moderate problem.   Inability to get enough air through her nose during exercise or exertion is a severe problem.           REVIEW OF SYSTEMS: a 12 system review was performed by the patient care staff:    Do you currently have or have you ever had in the past:    No. Complications with sedation or anesthesia.  No. Use blood thinners. No   No. Any heart problems.   No. Chest pain.   No. A pacemaker.  No. Problems with excessive bleeding or a bleeding disorder.  No. Problems with blood clots or a clotting disorder.   No. Sleep apnea or sleep with a CPAP machine.       No. Excessive scarring.   No. Night sweats.   No. Fevers.   Yes - \"Sometimes yes - I need to eat all the time or I get dizzy\". Double vision.   No. Vision loss.   No. Snoring.   Yes - \"Severe\". Difficulty breathing through your nose.   No. Runny nose.   No. Sneezing.   No. Itchy eyes.   No. Itchy throat.   No. Face pain.   No. Face weakness.   No. Face numbness.   No. Difficulty swallowing.   No. Pain with swallowing.,   No. Difficulty hearing or hearing loss.   No. Difficulty urinating.   No. Anxiety.   No. Depression.     FAMILY HISTORY:  No. Family history of excessive bleeding or a bleeding disorder?    No. Family history of blood clots or a clotting disorder?    Family History   Problem Relation Age of Onset     Coronary Artery Disease Maternal Grandfather        PAST MEDICAL " HISTORY:   Past Medical History:   Diagnosis Date     NO ACTIVE PROBLEMS (aka NONE)        PAST SURGICAL HISTORY:   Past Surgical History:   Procedure Laterality Date     HC CLOSED [place] NASAL BONE FRACTURE W/O STABILIZATION  2005       SOCIAL HISTORY:   Social History   Substance Use Topics     Smoking status: Never Smoker     Smokeless tobacco: Never Used     Alcohol use No       ALLERGIES: Review of patient's allergies indicates no known allergies.    MEDICATIONS:   Current Outpatient Prescriptions   Medication Sig Dispense Refill     penicillin V potassium (VEETID) 500 MG tablet Take 1 tablet (500 mg) by mouth 2 times daily 20 tablet 0         Patient Care Staff Signature: Pennie Castillo CMA (AAMA)    _______________________________________________      Provider- Review of systems, FH, PMH, PSH, SH, ALL, and Medications taken by the patient care staff was reviewed by me: Markus Flor        PHYSICAL EXAMINATION:    NOSE:   Nasal skin quality: thin    Frontal view:     Dorsum frontal view deviate right.      Nostril show: asymmetric with an elevated and notched left nostril margin    Lateral view:     Radix position: Normal.    Rhinion: ski slope profile.    Projection: appropriate.    Rotation:appropriate.    Alar sidewall: normal contour.    Alar-columellar relationship: normal.    Palpation:     Dorsal palpation: Irregularities present on palpation.    Nasal bone length: Normal.     Tip support: average.    Palpation of the septum reveals the caudal septum located on the nasal spine.    Base view:    Tip shape: tip christi present    External nasal valve: static valve collapse present right side    Lateral crura: normal.    Medial crura: normal.    Columella: deviate to the right    Intranasal exam/anterior rhinoscopy:     Septum deviation right in the region of internal and external nasal valve creating nasal airway obstruction on that side.    No septal perforation anteriorly.      Internal nasal valve:  narrowed on the right side.    Turbinate hypertrophy was observed.     No polyps or purulence.    Modified Gove maneuver: the patient reports a significant improvement in breathing during lateralization of the right nasal sidewall in the region of the  internal nasal valve.      Remainder of physical exam:   CONSTITUTIONAL:  No apparent distress.  Pleasant affect.  Normal ability to communicate.  CRANIOFACIAL:  Normocephalic, atraumatic.  SKIN:  No lesions or scars.  EYES:  Extraocular muscles intact.  NOSE: the nasal exam documentation above was scribed by our patient care staff as I was performing the examination.  EARS:  Normal auricles.   ORAL CAVITY AND OROPHARYNX: no lesions on inspection.  NECK:  The parotid is soft, without masses.  Supple laryngeal landmarks.  LYMPHATIC:  No palpable lymphadenopathy.  CARDIOVASCULAR:  Carotid pulses are palpable bilaterally.  NEUROLOGIC:  Facial nerve intact.  RESPIRATORY:  Normal respiratory effort.  No stridor.  Voice strong.        Procedure: Nasal endoscopy.   Indications: examine for posterior nasal cavity causes of nasal obstruction.  The nose was topically decongested and anesthestized with a compounded spray of 3% lidocaine and 0.25% phenylephrine through both nostrils. The nasal endoscope was passed under endoscopic vision. Normal middle turbinate architecture. No polyps or purulence. No septal perforation. No significant adenoid tissue. Eustachian tube orifices clear. No masses or lesions.

## 2018-08-02 NOTE — NURSING NOTE
Marizol Craig's goals for this visit include: yes    She requests these members of her care team be copied on today's visit information: yes      PCP: [facility]    Referring Provider:  No referring provider defined for this encounter.    There were no vitals taken for this visit.    Pennie Castillo CMA ([facility])

## 2018-08-02 NOTE — PATIENT INSTRUCTIONS
Nasal steroid trial: use the nasal steroid spray in each nostril two times a day (usually first thing in the morning and then before going to bed). Please use these medications for at least 6 weeks. Let Dr. Flor know at your follow up visit whether the steroid nasal spray improved your nasal breathing.     Observe your nasal cycle: The nasal cycle refers to the normal cycle of the nose with alternating stuffiness between the sides of the nose. Most people experience this alternating stuffiness but never notice any troubles breathing through their nose because one side is always open and able to breathe comfortably. For example, a person at the beginning of the day may notice their left nasal passage is stuffy but they breathe comfortably through the right nasal passage. Later in the day, their nose would have  cycled  and now their alternate nostril- the right nostril- is stuffy and the left nostril is open and they are breathing comfortably through the left side. Even later in the day, the stuffiness may have switched back to the left side.  This alternating stuffiness between the sides of the nose known as the nasal cycle and it is normal. This is important to understand because breathing through both nostrils at the same time is often not possible even after surgery.  The best breathing result possible may be that you breathe comfortably through one side of your nose at a time and it switches back and forth between the sides. Please observe your nasal breathing and see how this fits with the normal nasal cycle.     Please contact us if you have questions or if we can be of service.

## 2018-08-02 NOTE — MR AVS SNAPSHOT
After Visit Summary   8/2/2018    Marizol Craig    MRN: [id number]           Patient Information     Date Of Birth          1990        Visit Information        Provider Department      8/2/2018 3:00 PM Markus Flor MD [facility]        Today's Diagnoses     Nasal obstruction    -  1      Care Instructions      Nasal steroid trial: use the nasal steroid spray in each nostril two times a day (usually first thing in the morning and then before going to bed). Please use these medications for at least 6 weeks. Let Dr. Flor know at your follow up visit whether the steroid nasal spray improved your nasal breathing.     Observe your nasal cycle: The nasal cycle refers to the normal cycle of the nose with alternating stuffiness between the sides of the nose. Most people experience this alternating stuffiness but never notice any troubles breathing through their nose because one side is always open and able to breathe comfortably. For example, a person at the beginning of the day may notice their left nasal passage is stuffy but they breathe comfortably through the right nasal passage. Later in the day, their nose would have  cycled  and now their alternate nostril- the right nostril- is stuffy and the left nostril is open and they are breathing comfortably through the left side. Even later in the day, the stuffiness may have switched back to the left side.  This alternating stuffiness between the sides of the nose known as the nasal cycle and it is normal. This is important to understand because breathing through both nostrils at the same time is often not possible even after surgery.  The best breathing result possible may be that you breathe comfortably through one side of your nose at a time and it switches back and forth between the sides. Please observe your nasal breathing and see how this fits with the normal nasal cycle.     Please contact us if you have questions or if we  can be of service.          Follow-ups after your visit        Follow-up notes from your care team     Return in about 6 weeks (around 9/13/2018).      Your next 10 appointments already scheduled     Sep 13, 2018  3:15 PM CDT   [facility] Visit with Markus Flor MD   [facility]    [address] [place] [address] [phone]              Who to contact     If you have questions or need follow up information about today's clinic visit or your schedule please contact [facility] directly at [phone].  Normal or non-critical lab and imaging results will be communicated to you by MyChart, letter or phone within 4 business days after the clinic has received the results. If you do not hear from us within 7 days, please contact the clinic through MyChart or phone. If you have a critical or abnormal lab result, we will notify you by phone as soon as possible.  Submit refill requests through [employer] or call your pharmacy and they will forward the refill request to us. Please allow 3 business days for your refill to be completed.          Additional Information About Your Visit        Care EveryWhere ID     This is your Care EveryWhere ID. This could be used by other organizations to access your [facility] medical records  [id number]         Blood Pressure from Last 3 Encounters:   02/19/18 104/63   05/23/17 122/71   05/15/17 112/69    Weight from Last 3 Encounters:   02/19/18 50.2 kg (110 lb 9.6 oz)   05/23/17 61 kg (134 lb 6.4 oz)   05/15/17 60.8 kg (134 lb)              Today, you had the following     No orders found for display         Today's Medication Changes          These changes are accurate as of 8/2/18  4:35 PM.  If you have any questions, ask your nurse or doctor.               Start taking these medicines.        Dose/Directions    fluticasone 50 MCG/ACT spray   Commonly known as:  FLONASE   Used for:  Nasal obstruction    Started by:  Markus Flor MD        Dose:  1-2 spray   Spray 1-2 sprays into both nostrils daily   Quantity:  1 Bottle   Refills:  3            Where to get your medicines      These medications were sent to [facility] - [place], [place] - [address], [address]  [address], [address], [facility] [address]     Phone:  [phone]     fluticasone 50 MCG/ACT spray                Primary Care Provider Office Phone # Fax #    [facility] [place] [facility] [phone]       [address]  [facility] [address]        Equal Access to Services     [facility] : Hadii janine felton hadasho Soreglaali, waaxda luqadaha, qaybta kaalmada adeegyada, shirley monroy. So [facility] [phone].    ATENCIÓN: Si habla español, tiene a bray disposición servicios gratuitos de asistencia lingüística. Llame al [phone].    We comply with applicable federal civil rights laws and [place] laws. We do not discriminate on the basis of race, color, national origin, age, disability, sex, sexual orientation, or gender identity.            Thank you!     Thank you for choosing [facility]  for your care. Our goal is always to provide you with excellent care. Hearing back from our patients is one way we can continue to improve our services. Please take a few minutes to complete the written survey that you may receive in the mail after your visit with us. Thank you!             Your Updated Medication List - Protect others around you: Learn how to safely use, store and throw away your medicines at www.disposemymeds.org.          This list is accurate as of 8/2/18  4:35 PM.  Always use your most recent med list.                   Brand Name Dispense Instructions for use Diagnosis    fluticasone 50 MCG/ACT spray    FLONASE    1 Bottle    Spray 1-2 sprays into both nostrils daily    Nasal obstruction

## 2018-08-27 ENCOUNTER — TELEPHONE (OUTPATIENT)
Dept: FAMILY MEDICINE | Facility: CLINIC | Age: 28
End: 2018-08-27

## 2018-08-27 NOTE — TELEPHONE ENCOUNTER
Panel Management Review      BP Readings from Last 1 Encounters:   02/19/18 104/63      Last Office Visit with this department: 4/10/2018    Fail List measure: PAP      Patient is due/failing the following:   PAP    Action needed:   Patient needs office visit for PHYSICAL.    Type of outreach:    Sent letter.    Questions for provider review:    None                                                                                                                                    Thelma Mojica MA      Chart routed to  .

## 2018-08-27 NOTE — LETTER
August 27, 2018    Marizol Craig  [address] [facility]  [place] [address]      Dear Marizol Craig,     In order to ensure we are providing the best quality care, we have reviewed your chart and see that you are due for:    Physical with pap smear: Pap smear is a screening test used to detect cervical cancer. It is recommended every three years for women 21 and older. The test should be done at least once every three years but women who are at greater risk for cervical cancer may need to have the test more often.    Please call the clinic at your earliest convenience to schedule an appointment. If you have had any of the screenings listed above at another facility, please call us so that we may update your chart.      Thank you for trusting us with your health care.    Sincerely,    [facility]/mb  [phone]  [phone]

## 2018-12-10 ENCOUNTER — TELEPHONE (OUTPATIENT)
Dept: FAMILY MEDICINE | Facility: CLINIC | Age: 28
End: 2018-12-10

## 2018-12-10 NOTE — TELEPHONE ENCOUNTER
Panel Management Review      BP Readings from Last 1 Encounters:   02/19/18 104/63      Last Office Visit with this department: 8/27/2018    Fail List measure: PAP      Patient is due/failing the following:   PAP    Action needed:   Patient needs office visit for PHYSICAL.    Type of outreach:    Sent letter.    Questions for provider review:    None                                                                                                                                    Thelma Mojica MA      Chart routed to  .

## 2018-12-10 NOTE — LETTER
December 10, 2018    Marizol Craig  [address] [facility]  [place] [address]          Dear Marizol Craig,     In order to ensure we are providing the best quality care, we have reviewed your chart and see that you are due for:    Physical with pap smear: Pap smear is a screening test used to detect cervical cancer. It is recommended every three years for women 21 and older. The test should be done at least once every three years but women who are at greater risk for cervical cancer may need to have the test more often.    Please call the clinic at your earliest convenience to schedule an appointment. If you have had any of the screenings listed above at another facility, please call us so that we may update your chart.      Thank you for trusting us with your health care.    Sincerely,    [facility]/mb  [phone]  [phone]'

## 2019-03-12 ENCOUNTER — OFFICE VISIT (OUTPATIENT)
Dept: PEDIATRICS | Facility: CLINIC | Age: 29
End: 2019-03-12
Payer: COMMERCIAL

## 2019-03-12 VITALS
SYSTOLIC BLOOD PRESSURE: 100 MMHG | DIASTOLIC BLOOD PRESSURE: 50 MMHG | HEIGHT: 64 IN | BODY MASS INDEX: 18.54 KG/M2 | TEMPERATURE: 97.8 F | WEIGHT: 108.6 LBS | HEART RATE: 83 BPM | OXYGEN SATURATION: 99 %

## 2019-03-12 DIAGNOSIS — O21.0 MORNING SICKNESS: ICD-10-CM

## 2019-03-12 DIAGNOSIS — Z32.01 PREGNANCY EXAMINATION OR TEST, POSITIVE RESULT: Primary | ICD-10-CM

## 2019-03-12 DIAGNOSIS — Z32.00 PREGNANCY EXAMINATION OR TEST, PREGNANCY UNCONFIRMED: ICD-10-CM

## 2019-03-12 LAB — HCG UR QL: POSITIVE

## 2019-03-12 PROCEDURE — 81025 URINE PREGNANCY TEST: CPT | Performed by: NURSE PRACTITIONER

## 2019-03-12 PROCEDURE — 99213 OFFICE O/P EST LOW 20 MIN: CPT | Performed by: NURSE PRACTITIONER

## 2019-03-12 RX ORDER — ONDANSETRON 4 MG/1
4 TABLET, FILM COATED ORAL EVERY 8 HOURS PRN
Qty: 20 TABLET | Refills: 0 | Status: SHIPPED | OUTPATIENT
Start: 2019-03-12 | End: 2019-09-18

## 2019-03-12 ASSESSMENT — MIFFLIN-ST. JEOR: SCORE: 1221.58

## 2019-03-12 NOTE — NURSING NOTE
"Chief Complaint   Patient presents with     Confirmation Of Pregnancy     LMP:1/21/19       Initial /50 (BP Location: Right arm, Patient Position: Sitting, Cuff Size: Adult Regular)   Pulse 83   Temp 97.8  F (36.6  C) (Temporal)   Ht 1.632 m (5' 4.25\")   Wt 49.3 kg (108 lb 9.6 oz)   LMP 01/21/2019   SpO2 99%   Breastfeeding? No   BMI 18.50 kg/m   Estimated body mass index is 18.5 kg/m  as calculated from the following:    Height as of this encounter: 1.632 m (5' 4.25\").    Weight as of this encounter: 49.3 kg (108 lb 9.6 oz).  Medication Reconciliation: complete      RICHARD Ramos      "
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
## 2019-03-12 NOTE — PATIENT INSTRUCTIONS
PLAN:   1.   Symptomatic therapy suggested: increase fluids, small amounts of clear fluids frequently and call prn if symptoms persist or worsen.    2.  Orders Placed This Encounter   Medications     ondansetron (ZOFRAN) 4 MG tablet     Sig: Take 1 tablet (4 mg) by mouth every 8 hours as needed for nausea     Dispense:  20 tablet     Refill:  0     Orders Placed This Encounter   Procedures     HCG Qual, Urine ([id number])     OB/GYN REFERRAL       3. Patient needs to follow up in if no improvement,or sooner if worsening of symptoms or other symptoms develop.  CONSULTATION/REFERRAL to Gynecology

## 2019-04-01 ENCOUNTER — PRENATAL OFFICE VISIT (OUTPATIENT)
Dept: OBGYN | Facility: CLINIC | Age: 29
End: 2019-04-01
Payer: COMMERCIAL

## 2019-04-01 ENCOUNTER — ANCILLARY PROCEDURE (OUTPATIENT)
Dept: ULTRASOUND IMAGING | Facility: CLINIC | Age: 29
End: 2019-04-01
Attending: OBSTETRICS & GYNECOLOGY
Payer: COMMERCIAL

## 2019-04-01 VITALS
HEART RATE: 91 BPM | DIASTOLIC BLOOD PRESSURE: 60 MMHG | WEIGHT: 108 LBS | SYSTOLIC BLOOD PRESSURE: 100 MMHG | BODY MASS INDEX: 18.39 KG/M2 | OXYGEN SATURATION: 99 %

## 2019-04-01 DIAGNOSIS — Z34.80 SUPERVISION OF OTHER NORMAL PREGNANCY, ANTEPARTUM: ICD-10-CM

## 2019-04-01 LAB
ABO + RH BLD: NORMAL
ABO + RH BLD: NORMAL
BLD GP AB SCN SERPL QL: NORMAL
BLOOD BANK CMNT PATIENT-IMP: NORMAL
ERYTHROCYTE [DISTWIDTH] IN BLOOD BY AUTOMATED COUNT: 12.4 % (ref 10–15)
HCT VFR BLD AUTO: 37.2 % (ref 35–47)
HGB BLD-MCNC: 12.6 G/DL (ref 11.7–15.7)
MCH RBC QN AUTO: 30.1 PG (ref 26.5–33)
MCHC RBC AUTO-ENTMCNC: 33.9 G/DL (ref 31.5–36.5)
MCV RBC AUTO: 89 FL (ref 78–100)
PLATELET # BLD AUTO: 188 10E9/L (ref 150–450)
RBC # BLD AUTO: 4.18 10E12/L (ref 3.8–5.2)
SPECIMEN EXP DATE BLD: NORMAL
WBC # BLD AUTO: 5.9 10E9/L (ref 4–11)

## 2019-04-01 PROCEDURE — 86850 RBC ANTIBODY SCREEN: CPT | Performed by: OBSTETRICS & GYNECOLOGY

## 2019-04-01 PROCEDURE — 87389 HIV-1 AG W/HIV-1&-2 AB AG IA: CPT | Performed by: OBSTETRICS & GYNECOLOGY

## 2019-04-01 PROCEDURE — 87086 URINE CULTURE/COLONY COUNT: CPT | Performed by: OBSTETRICS & GYNECOLOGY

## 2019-04-01 PROCEDURE — 86901 BLOOD TYPING SEROLOGIC RH(D): CPT | Performed by: OBSTETRICS & GYNECOLOGY

## 2019-04-01 PROCEDURE — 0064U ANTB TP TOTAL&RPR IA QUAL: CPT | Performed by: OBSTETRICS & GYNECOLOGY

## 2019-04-01 PROCEDURE — 86762 RUBELLA ANTIBODY: CPT | Performed by: OBSTETRICS & GYNECOLOGY

## 2019-04-01 PROCEDURE — 36415 COLL VENOUS BLD VENIPUNCTURE: CPT | Performed by: OBSTETRICS & GYNECOLOGY

## 2019-04-01 PROCEDURE — 87340 HEPATITIS B SURFACE AG IA: CPT | Performed by: OBSTETRICS & GYNECOLOGY

## 2019-04-01 PROCEDURE — 86900 BLOOD TYPING SEROLOGIC ABO: CPT | Performed by: OBSTETRICS & GYNECOLOGY

## 2019-04-01 PROCEDURE — 85027 COMPLETE CBC AUTOMATED: CPT | Performed by: OBSTETRICS & GYNECOLOGY

## 2019-04-01 PROCEDURE — 76801 OB US < 14 WKS SINGLE FETUS: CPT | Performed by: RADIOLOGY

## 2019-04-01 PROCEDURE — 99207 ZZC FIRST OB VISIT: CPT | Performed by: OBSTETRICS & GYNECOLOGY

## 2019-04-01 NOTE — PATIENT INSTRUCTIONS
If you have any questions regarding your visit, Please contact your care team.     [employer]: [phone]  [facility] CLINIC HOURS TELEPHONE NUMBER       Carlos Frost M.D.        Zenaida-    RN-      Omni-Medical Assistant       [facility]  8:00a.m-4:45 p.m  Tuesday-[facility]  9:00a.m-4:00 p.m  Wednesday-[place]a Leigh 8:00a.m-4:45 p.m.  Thursday-[place]  8:00a.m-4:45 p.m.  Friday-[place]  8:00a.m-4:45 p.m. [facility]  [address]  [place], [place] [address] [phone] ask [facility]  [phone] Fax  Imaging [phone]    [facility]  [address] [place] [address] Dr.  [place], [place] [address] [phone]    [facility]  [address] [place] [address]  [phone] ask [facility]  [phone] Fax  Imaging [phone]     Urgent Care locations:    [place]        [place] Monday-Friday  5 pm - 9 pm  Saturday and Sunday   9 am - 5 pm  Monday-Friday   11 am - 9 pm  Saturday and Sunday   9 am - 5 pm   [phone]   If you need a medication refill, please contact your pharmacy. Please allow 3 business days for your refill to be completed.  As always, Thank you for trusting us with your healthcare needs!

## 2019-04-02 LAB
BACTERIA SPEC CULT: NO GROWTH
HBV SURFACE AG SERPL QL IA: NONREACTIVE
HIV 1+2 AB+HIV1 P24 AG SERPL QL IA: NONREACTIVE
RUBV IGG SERPL IA-ACNC: 23 IU/ML
SPECIMEN SOURCE: NORMAL
T PALLIDUM AB SER QL: NONREACTIVE

## 2019-04-02 NOTE — PROGRESS NOTES
Marizol Craig is a 28 year old year old G 3 P 2 who presents for an initial obstetrical visit.  Referred by self.    Currently experiencing normal pregnancy symptoms without particular problems including pain, bleeding, marked vomiting or weight loss except: N/V with ED visit yest for dehydration. Has variable relief with Zofran and prefers to minimize meds if able. Using Seabands and Preggy pops now. Will try adding Vit B6 and Unisom.   This was a planned pregnancy. Had missed last postpartum exam.   Here today alone.   Family doing well.   Additional concerns: irregular menses and uncertain dates.     ROS:     Systems reviewed include constitutional; breast;                 cardiac; respiratory; gastrointestinal; genitourinary;                                musculoskeletal; integumentary; psychological;                                hematologic/lymphatic and endocrine.                  These systems were negative for significant symptoms except                 for the following: none and see above HPI.    Past medical, obstetrical, surgical, family and social history reviewed and as noted or updated in chart.     Allergies, meds and supplements are as noted or updated in chart.                    Episode OB dating, demographic and history forms completed or reviewed.    EXAM:  VS as noted. BMI- Body mass index is 18.39 kg/m .    Relatively recent normal general exam- not repeated now.         ASSESSMENT: (Z34.80) Supervision of other normal pregnancy, antepartum  Comment:   Plan: ABO/Rh type and screen, CBC with platelets,         Hepatitis B surface antigen, Rubella Antibody         IgG Quantitative, Urine Culture Aerobic         Bacterial, HIV Antigen Antibody Combo,         Treponema Abs w Reflex to RPR and Titer, US OB         < 14 Weeks Single               PLAN:  Advice appropriate to gestational age reviewed including pertinent Checklist items. Discussed condition, tests and general care plan. Symptoms,  problems and concerns reviewed. Recommended weight gain discussed. Problem list initiated.   Check ultrasound now. Pap due now. Orders as noted. RTC in 4 weeks. Pap and discuss special screening tests next.    Total encounter time (physician together with patient) = 30min. Direct counseling, education and care coordination time (physician together with patient) = 20min.      Carlos Frost MD

## 2019-05-03 ENCOUNTER — NURSE TRIAGE (OUTPATIENT)
Dept: NURSING | Facility: CLINIC | Age: 29
End: 2019-05-03
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
## 2019-05-04 ENCOUNTER — NURSE TRIAGE (OUTPATIENT)
Dept: NURSING | Facility: CLINIC | Age: 29
End: 2019-05-04
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
## 2019-05-20 ENCOUNTER — TELEPHONE (OUTPATIENT)
Dept: OBGYN | Facility: CLINIC | Age: 29
End: 2019-05-20

## 2019-05-20 NOTE — TELEPHONE ENCOUNTER
Reason for call:  Other   Patient called regarding (reason for call): call back    Additional comments: Patient wants call back, did not come to 14 week appointment, is now on vacation. But wants to have an Ultrasound so she can find out the gender of her baby.       Phone number to reach patient:  Home number on file [phone] (home)    Best Time:  anytime    Can we leave a detailed message on this number?  YES

## 2019-05-20 NOTE — TELEPHONE ENCOUNTER
Called patient and she will be out of town til the end of the week. Scheduled with Dr. Agbeh for Tuesday the 28th    [facility]  Women's Health

## 2019-05-21 ENCOUNTER — TELEPHONE (OUTPATIENT)
Dept: OBGYN | Facility: CLINIC | Age: 29
End: 2019-05-21

## 2019-05-21 NOTE — TELEPHONE ENCOUNTER
Called patient to inform that we are giving gender out at ultrasounds if we can see anatomy. patient pleased    Monika García  Women's Health

## 2019-05-28 ENCOUNTER — PRENATAL OFFICE VISIT (OUTPATIENT)
Dept: OBGYN | Facility: CLINIC | Age: 29
End: 2019-05-28
Payer: COMMERCIAL

## 2019-05-28 VITALS
DIASTOLIC BLOOD PRESSURE: 67 MMHG | WEIGHT: 117.4 LBS | HEART RATE: 67 BPM | SYSTOLIC BLOOD PRESSURE: 113 MMHG | BODY MASS INDEX: 20 KG/M2 | OXYGEN SATURATION: 99 %

## 2019-05-28 DIAGNOSIS — Z34.80 SUPERVISION OF OTHER NORMAL PREGNANCY, ANTEPARTUM: ICD-10-CM

## 2019-05-28 PROCEDURE — 99207 ZZC PRENATAL VISIT: CPT | Performed by: OBSTETRICS & GYNECOLOGY

## 2019-05-28 RX ORDER — PRENATAL VIT/IRON FUM/FOLIC AC 27MG-0.8MG
1 TABLET ORAL DAILY
COMMUNITY
End: 2019-09-18

## 2019-05-28 NOTE — PROGRESS NOTES
18w3d Eating well. . Discussed genetic screening.Declinedg quad screen. Plan for 20wk US.  return to clinic in 3 weeks.    ICD-10-CM    1. Supervision of other normal pregnancy, antepartum Z34.80 US OB > 14 Weeks     CEPHAS AGBEH, MD.

## 2019-05-28 NOTE — PATIENT INSTRUCTIONS
If you have any questions regarding your visit, Please contact your care team.  [employer] [facility]: [phone]  [facility] CLINIC HOURS TELEPHONE NUMBER   Cephas Agbeh, M.D. Lisa -       Becka Morris         Monday-[place]    8:00a.m-4:45 p.m    Tuesday--[place]     8:00a.m-4:45 p.m.    Thursday-[place]    8:00a.m-4:45 p.m.    Friday-[place]    8:00a.m-4:45 p.m    [facility]   [address] [place] [address]   [phone]-Ask for [facility]   Fax [phone]   [phone]     [facility] Labor and Delivery   [address] [place] [address] [place] [address]   [phone]    [facility]  [address] [facility] [address]  [phone]  [phone]   Urgent Care locations:    [facility] Monday-Friday  5 pm - 9 pm  Saturday and Sunday   9 am - 5 pm   Monday-Friday   5 pm - 9 pm  Saturday and Sunday  9 am - 5 pm    [phone]   If you need a medication refill, please contact your pharmacy. Please allow 3 business days for your refill to be completed.  As always, Thank you for trusting us with your healthcare needs!

## 2019-06-07 ENCOUNTER — ANCILLARY PROCEDURE (OUTPATIENT)
Dept: ULTRASOUND IMAGING | Facility: CLINIC | Age: 29
End: 2019-06-07
Attending: OBSTETRICS & GYNECOLOGY
Payer: COMMERCIAL

## 2019-06-07 DIAGNOSIS — Z34.80 SUPERVISION OF OTHER NORMAL PREGNANCY, ANTEPARTUM: ICD-10-CM

## 2019-06-07 PROCEDURE — 76805 OB US >/= 14 WKS SNGL FETUS: CPT

## 2019-06-17 ENCOUNTER — TELEPHONE (OUTPATIENT)
Dept: OBGYN | Facility: CLINIC | Age: 29
End: 2019-06-17

## 2019-06-17 NOTE — TELEPHONE ENCOUNTER
Notes recorded by Agbeh, Cephas Mawuena, MD on 6/7/2019 at 2:26 PM CDT  Marizol, your screening ultrasound is reviewed and is normal.    We can discuss any questions you have at your next OB appointment.  CEPHAS AGBEH, MD    Patient states she hasn't received a letter like this before and she's had three kids. RN explained that results are completely normal and there is nothing to be concerned about, MA just mailed the normal results to her. Patient verbalized understanding and agreed to plan. Patient was reassured and happy to hear there is nothing to be worried about, she just wanted clarification. No further questions.    Leann Kearney RN on 6/17/2019 at 11:34 AM

## 2019-06-17 NOTE — TELEPHONE ENCOUNTER
Patient would like to talk to the nurse about her ultra sound letter she received.  Please call.  Thank you.

## 2019-07-01 ENCOUNTER — PRENATAL OFFICE VISIT (OUTPATIENT)
Dept: OBGYN | Facility: CLINIC | Age: 29
End: 2019-07-01
Payer: COMMERCIAL

## 2019-07-01 VITALS
WEIGHT: 123.8 LBS | BODY MASS INDEX: 21.09 KG/M2 | OXYGEN SATURATION: 99 % | SYSTOLIC BLOOD PRESSURE: 94 MMHG | HEART RATE: 80 BPM | DIASTOLIC BLOOD PRESSURE: 59 MMHG

## 2019-07-01 DIAGNOSIS — Z34.80 SUPERVISION OF OTHER NORMAL PREGNANCY, ANTEPARTUM: ICD-10-CM

## 2019-07-01 PROCEDURE — 99207 ZZC PRENATAL VISIT: CPT | Performed by: OBSTETRICS & GYNECOLOGY

## 2019-07-01 NOTE — PATIENT INSTRUCTIONS
If you have any questions regarding your visit, Please contact your care team.     [employer] [facility]: [phone]  [facility] CLINIC HOURS TELEPHONE NUMBER       Carlos Frost M.D.        Zenaida-    Becka Rodríguez-Medical Assistant       Monday-[place]  8:00a.m-4:45 p.m  Tuesday-[place]  9:00a.m-4:00 p.m  Wednesday-[place] 8:00a.m-4:45 p.m.  Thursday-[place]  8:00a.m-4:45 p.m.  Friday-[place]  8:00a.m-4:45 p.m. [facility]  [address] [place] [address]  [phone] ask for [facility]  [phone] Fax  Imaging [phone]    [facility] Labor and Delivery  [address] [place] [address] [place] [address]  [phone]    [facility]  [address] [place] [address]  [phone] ask [facility]  [phone] Fax  Imaging [phone]     Urgent Care locations:    [facility] Monday-Friday  5 pm - 9 pm  Saturday and Sunday   9 am - 5 pm  Monday-Friday   11 am - 9 pm  Saturday and Sunday   9 am - 5 pm   [phone]   If you need a medication refill, please contact your pharmacy. Please allow 3 business days for your refill to be completed.  As always, Thank you for trusting us with your healthcare needs!

## 2019-07-01 NOTE — PROGRESS NOTES
No signif signs, symptoms or concerns except had interval face and chest burns from cooking accident and recovered. Plan Pap postpartum. Here with children. Advice appropriate to gestational age reviewed including pertinent Checklist items. Discussed condition, tests and care plan including RBA. Problem list updated. Considering 1h GTT next- discussed.  A/P:  Marizol was seen today for prenatal care.    Diagnoses and all orders for this visit:    Supervision of other normal pregnancy, antepartum        Total encounter time (physician together with patient) = 15min. Direct counseling, education and care coordination time (physician together with patient) = 10min.  Carlos Frost MD

## 2019-09-18 ENCOUNTER — PRENATAL OFFICE VISIT (OUTPATIENT)
Dept: OBGYN | Facility: CLINIC | Age: 29
End: 2019-09-18
Payer: COMMERCIAL

## 2019-09-18 VITALS
TEMPERATURE: 97.7 F | HEART RATE: 85 BPM | DIASTOLIC BLOOD PRESSURE: 66 MMHG | BODY MASS INDEX: 22.65 KG/M2 | WEIGHT: 133 LBS | SYSTOLIC BLOOD PRESSURE: 113 MMHG

## 2019-09-18 DIAGNOSIS — Z34.80 SUPERVISION OF OTHER NORMAL PREGNANCY, ANTEPARTUM: ICD-10-CM

## 2019-09-18 PROCEDURE — 99207 ZZC PRENATAL VISIT: CPT | Performed by: OBSTETRICS & GYNECOLOGY

## 2019-09-18 RX ORDER — PRENATAL VIT/IRON FUM/FOLIC AC 27MG-0.8MG
1 TABLET ORAL
COMMUNITY

## 2019-09-18 NOTE — PATIENT INSTRUCTIONS
If you have any questions regarding your visit, Please contact your care team.     [employer] [facility]: [phone]  Women s Health CLINIC HOURS TELEPHONE NUMBER       Carlos Frost M.D.      Becka Rodríguez-Medical Assistant     Monday-[place]  8:00a.m-4:45 p.m  Tuesday-[place]  9:00a.m-4:00 p.m  Wednesday-[place] 8:00a.m-4:45 p.m.  Thursday-[place]  8:00a.m-4:45 p.m.  Friday-[place]  8:00a.m-4:45 p.m. [facility]  [address] [place] [address]  [phone] ask [facility]  [phone] Fax  Imaging [phone]    [facility] Labor and Delivery  [address] [place] [address] [place] [address]  [phone]    [facility]  [address] [place] [address]  [phone] [facility]  [phone] Fax  Imaging [phone]     Urgent Care locations:    [place]        [place] Monday-Friday  5 pm - 9 pm  Saturday and Sunday   9 am - 5 pm  Monday-Friday   11 am - 9 pm  Saturday and Sunday   9 am - 5 pm   [phone]   If you need a medication refill, please contact your pharmacy. Please allow 3 business days for your refill to be completed.  As always, Thank you for trusting us with your healthcare needs!

## 2019-09-18 NOTE — PROGRESS NOTES
No significant signs, symptoms or concerns. Gap in care noted. Missed 1h GTT and will skip this. Tdap declined. Advice appropriate to gestational age reviewed including pertinent Checklist items. Discussed condition, tests and care plan including RBA. Problem list updated. GBS next.  A/P:  Marizol was seen today for prenatal care.    Diagnoses and all orders for this visit:    Supervision of other normal pregnancy, antepartum        Total encounter time (physician together with patient) = 15min. Direct counseling, education and care coordination time (physician together with patient) = 10min.  Carlos Frost MD

## 2019-10-04 ENCOUNTER — PRENATAL OFFICE VISIT (OUTPATIENT)
Dept: OBGYN | Facility: CLINIC | Age: 29
End: 2019-10-04
Payer: COMMERCIAL

## 2019-10-04 VITALS
TEMPERATURE: 98.2 F | WEIGHT: 137 LBS | BODY MASS INDEX: 23.33 KG/M2 | HEART RATE: 114 BPM | SYSTOLIC BLOOD PRESSURE: 128 MMHG | DIASTOLIC BLOOD PRESSURE: 78 MMHG

## 2019-10-04 DIAGNOSIS — Z34.80 SUPERVISION OF OTHER NORMAL PREGNANCY, ANTEPARTUM: ICD-10-CM

## 2019-10-04 PROCEDURE — 87653 STREP B DNA AMP PROBE: CPT | Performed by: OBSTETRICS & GYNECOLOGY

## 2019-10-04 PROCEDURE — 99207 ZZC PRENATAL VISIT: CPT | Performed by: OBSTETRICS & GYNECOLOGY

## 2019-10-04 NOTE — PROGRESS NOTES
No significant signs, symptoms or concerns except some episodic low BP. Advice appropriate to gestational age reviewed including pertinent Checklist items. Discussed condition, tests and care plan including risks, benefits, and alternatives. Problem list updated.   A/P:  Marizol was seen today for prenatal care.    Diagnoses and all orders for this visit:    Supervision of other normal pregnancy, antepartum  -     Group B strep PCR        Total encounter time (physician together with patient) = 15min. Direct counseling, education and care coordination time (physician together with patient) = 10min.  Carlos Frost MD

## 2019-10-04 NOTE — PATIENT INSTRUCTIONS
If you have any questions regarding your visit, Please contact your care team.     [employer] [facility]: [phone]  Women s Health CLINIC HOURS TELEPHONE NUMBER       Carlos Frost M.D.      Becka Rodríguez-Medical Assistant     Monday-[place]  8:00a.m-4:45 p.m  Tuesday-[place]  9:00a.m-4:00 p.m  Wednesday-[place] 8:00a.m-4:45 p.m.  Thursday-[place]  8:00a.m-4:45 p.m.  Friday-[place]  8:00a.m-4:45 p.m. [facility]  [address] [place] [address]  [phone] ask [facility]  [phone] Fax  Imaging [phone]    [facility] Labor and Delivery  [address] [place] [address] [place] [address]  [phone]    [facility]  [address] [place] [address]  [phone] [facility]  [phone] Fax  Imaging [phone]     Urgent Care locations:    [place]        [place] Monday-Friday  5 pm - 9 pm  Saturday and Sunday   9 am - 5 pm  Monday-Friday   11 am - 9 pm  Saturday and Sunday   9 am - 5 pm   [phone]   If you need a medication refill, please contact your pharmacy. Please allow 3 business days for your refill to be completed.  As always, Thank you for trusting us with your healthcare needs!

## 2019-10-05 LAB
GP B STREP DNA SPEC QL NAA+PROBE: NEGATIVE
SPECIMEN SOURCE: NORMAL

## 2019-10-21 ENCOUNTER — TELEPHONE (OUTPATIENT)
Dept: OBGYN | Facility: CLINIC | Age: 29
End: 2019-10-21

## 2019-10-21 NOTE — TELEPHONE ENCOUNTER
RN returned call to patient to follow up. RN states that she will consult with Dr. Frost in clinic and we will get back to her today.     She is worried about getting thrush. She has been pumping but hasn't breast fed infant.     If prescription is ordered please send to [facility] in [place].     Patient verbalized understanding and agreed to plan.   Patient was given the opportunity to ask additional questions and have them answered. Patient had no further questions.       Leann Kearney RN on 10/21/2019 at 4:57 PM

## 2019-10-21 NOTE — TELEPHONE ENCOUNTER
Health Call Center    Phone Message    May a detailed message be left on voicemail: yes    Reason for Call: Patient was advised to call Dr. Frost by her baby's pediatrician.  Baby has Thrush and they are recommending the patient be put on antibiotics.  The patient stated she is not currently symptomatic.  The patient also has questions regarding breast feeding. Please advise.  Thank you.     Action Taken:

## 2019-10-21 NOTE — TELEPHONE ENCOUNTER
I called patient. Infant son diagnosed today with first episode of thrush and started on fluconazole drops. Patient had cracking of nipples but has improved. Denies deeper breast pain, engorgement, or fever. Advised to resume breast feeding in 24h and no treatment for her at this time- not always needed unless more symptoms for her or he has recurrences. I can prescribe extended fluconazole course for her later prn. Patient agrees.   Carlos Frost MD

## 2019-10-21 NOTE — TELEPHONE ENCOUNTER
Health Call Center    Phone Message    May a detailed message be left on voicemail: yes    Reason for Call: new prescription  Patients baby was seen today and has thrush. Was told by baby's doctor to contact her OB so that she could get a prescription also. Pharmacy is [facility] in [place]. Any questions, please call.     Action Taken: Message routed to:  [facility] p [phone]

## 2019-11-14 ENCOUNTER — TELEPHONE (OUTPATIENT)
Dept: OBGYN | Facility: CLINIC | Age: 29
End: 2019-11-14

## 2019-11-14 DIAGNOSIS — B37.89 CANDIDIASIS OF BREAST: Primary | ICD-10-CM

## 2019-11-14 RX ORDER — FLUCONAZOLE 200 MG/1
200 TABLET ORAL DAILY
Qty: 14 TABLET | Refills: 0 | Status: SHIPPED | OUTPATIENT
Start: 2019-11-14

## 2019-11-14 NOTE — TELEPHONE ENCOUNTER
RN called and spoke with patient, informing her that a prescription was sent to pharmacy for treatment.     Patient verbalized understanding and agreed to plan.     Leann Kearney RN on 11/14/2019 at 3:41 PM

## 2019-11-14 NOTE — TELEPHONE ENCOUNTER
RN returned call to patient. She states baby has thrush for second time. Baby was treated for this the first time, mom was not treated as she did not have symptoms. A week later and baby is now diagnosed with thrush again, mom is still denying any symptoms. Mom is requesting to be treated as well. If provider wants to treat please send to [facility] in [place].     RN will route to Dr. Frost.      Patient verbalized understanding and agreed to plan.     Leann Kearney RN on 11/14/2019 at 2:52 PM

## 2019-11-14 NOTE — TELEPHONE ENCOUNTER
I have sent a prescription for extended oral treatment for patient for this. Please notify.   Carlos Frost MD

## 2019-11-14 NOTE — TELEPHONE ENCOUNTER
[facility]    Phone Message    May a detailed message be left on voicemail: yes    Reason for Call: Other: Patient states her baby has thrush and his doctor told patient to get medication    Pharmacy: depends on when sent. [facility], please call before placing    Action Taken: Message routed to:  [facility] Clinic p [phone]

## 2024-10-30 ENCOUNTER — VIRTUAL VISIT (OUTPATIENT)
Dept: OBGYN | Facility: CLINIC | Age: 34
End: 2024-10-30
Payer: COMMERCIAL

## 2024-10-30 DIAGNOSIS — Z34.80 PRENATAL CARE, SUBSEQUENT PREGNANCY, UNSPECIFIED TRIMESTER: Primary | ICD-10-CM

## 2024-10-30 PROCEDURE — 99207 PR NO CHARGE NURSE ONLY: CPT | Mod: 93

## 2024-10-30 NOTE — PROGRESS NOTES
NPN nurse visit done over the phone. Pt will be given NPN folder and book at her upcoming appt.  Discussed optional screening available to assess chromosomal anomalies. Questions answered. Informed pt of the clinic structure (on-call does deliveries for the day, may be male or female doctor). Pt advised to call the clinic if she has any questions or concerns related to her pregnancy. Prenatal labs will be obtained at her upcoming appt. New prenatal visit scheduled on 2024 with ROOSEVELT Parikh.    12w3d    Very unsure of LMP!!  Pt thinks maybe 2024    Menstrual cycles: regular  Date of positive pregnancy test: 2024  Medications stopped upon pos HPT: none    Covid: no  Chicken Pox: vaccinated  Flu: no  NIPT:  Wants      Last pap: unknown per pt.    Lab Results   Component Value Date    PAP NIL 2014             Patient supplied answers from flow sheet for:  Prenatal OB Questionnaire.  Past Medical History  Have you ever recieved care for your mental health? : No  Have you ever been in a major accident or suffered serious trauma?: No  Within the last year, has anyone hit, slapped, kicked or otherwise hurt you?: No  In the last year, has anyone forced you to have sex when you didn't want to?: No    Past Medical History 2   Have you ever received a blood transfusion?: No  Would you accept a blood transfusion if was medically recommended?: Yes  Does anyone in your home smoke?: No   Is your blood type Rh negative?: No  Have you ever ?: (!) Yes  Have you been hospitalized for a nonsurgical reason excluding normal delivery?: No  Have you ever had an abnormal pap smear?: No    Past Medical History (Continued)  Do you have a history of abnormalities of the uterus?: No  Did your mother take WANDA or any other hormones when she was pregnant with you?: No  Do you have any other problems we have not asked about which you feel may be important to this pregnancy?: No    Anastasiia Cao LPN on  10/30/2024 at 10:33 AM                   \

## 2024-11-01 ENCOUNTER — TELEPHONE (OUTPATIENT)
Dept: OBGYN | Facility: CLINIC | Age: 34
End: 2024-11-01

## 2024-11-01 ENCOUNTER — ANCILLARY PROCEDURE (OUTPATIENT)
Dept: ULTRASOUND IMAGING | Facility: OTHER | Age: 34
End: 2024-11-01
Attending: OBSTETRICS & GYNECOLOGY
Payer: COMMERCIAL

## 2024-11-01 DIAGNOSIS — Z34.80 PRENATAL CARE, SUBSEQUENT PREGNANCY, UNSPECIFIED TRIMESTER: ICD-10-CM

## 2024-11-01 PROCEDURE — 76801 OB US < 14 WKS SINGLE FETUS: CPT | Mod: TC | Performed by: RADIOLOGY

## 2024-11-01 NOTE — TELEPHONE ENCOUNTER
S: OB results    B: Patient states she had an US today for baby #4 to determine the VALORIE. Patient states she was giving conflicting information - 9w 3d and 12w 5d; she is unsure of how far along she is.     A: Writer informed patient that once the doctor has read the US, then they will follow up with communication.    R: Routed message to provider to continue with conversation.     Melody Cantrell RN on 11/1/2024 at 12:55 PM

## 2024-11-05 NOTE — TELEPHONE ENCOUNTER
Rani Parikh,   [facility] Triage2 minutes ago (3:33 PM)       Please let this patient know that based on our earliest OB US in the chart which was on 11/1/2024 at 9 weeks 3 days, her EDC is 6/3/2025.     10w0d today, 11/5, based on VALORIE from US.  Notified pt of this.    Cindy Perez RN- OB/GYN group

## 2024-11-24 ENCOUNTER — HEALTH MAINTENANCE LETTER (OUTPATIENT)
Age: 34
End: 2024-11-24

## 2024-12-04 ENCOUNTER — MYC MEDICAL ADVICE (OUTPATIENT)
Dept: OBGYN | Facility: CLINIC | Age: 34
End: 2024-12-04
Payer: COMMERCIAL

## 2024-12-04 NOTE — PATIENT INSTRUCTIONS
"Weeks 18 to 22 of Your Pregnancy: Care Instructions  At this stage you may find that your nausea and fatigue are gone. You may feel better overall and have more energy. But you might now also have some new discomforts, like sleep problems or leg cramps.    You may start to feel your baby move. These movements can feel like butterflies or bubbles.   Babies at this stage can now suck their thumbs.     Get some exercise every day.  And avoid caffeine late in the day.     Take a warm shower or bath before bed.  Try relaxation exercises to calm your mind and body.     Use extra pillows.  They can help you get comfortable.     Don't use sleeping pills or alcohol.  They could harm your baby.     For leg cramps, stretch and apply heat.  A warm bath, leg warmers, a heating pad, or a hot water bottle can help with muscle aches.   Stretches for leg cramps    Straighten your leg and bend your foot (flex your ankle) slowly upward, toward your knee. Bend your toes up and down.   Stand on a flat surface. Stretch your toes upward. For balance, hold on to the wall or something stable. If it feels okay, take small steps walking on your heels.   Follow-up care is a key part of your treatment and safety. Be sure to make and go to all appointments, and call your doctor if you are having problems. It's also a good idea to know your test results and keep a list of the medicines you take.  Where can you learn more?  Go to https://www.[employer].net/patiented  Enter W603 in the search box to learn more about \"Weeks 18 to 22 of Your Pregnancy: Care Instructions.\"  Current as of: July 10, 2023  Content Version: 14.2 2024 [facility] [employer].   Care instructions adapted under license by your healthcare professional. If you have questions about a medical condition or this instruction, always ask your healthcare professional. Healthwise[employer] disclaims any warranty or liability for your use of this information.    Weeks 22 to 26 of " "Your Pregnancy: Care Instructions  Your baby's lungs are getting ready for breathing. Your baby may respond to your voice. Your baby likely turns less, and kicks or jerks more. Jerking may mean that your baby has hiccups.    Think about taking childbirth classes. And start to think about whether you want to have pain medicine during labor.   At your next doctor visit, you may be tested for anemia and for high blood sugar that first occurs during pregnancy (gestational diabetes). These conditions can cause problems for you and your baby.         To ease discomfort, such as back pain   Change your position often. Try not to sit or stand for too long.  Get some exercise. Things like walking or stretching may help.  Try using a heating pad or cold pack.        To ease or reduce swelling in your feet, ankles, hands, and fingers   Take off your rings.  Avoid high-sodium foods, such as potato chips.  Prop up your feet, and sleep with pillows under your feet.  Try to avoid standing for long periods of time.  Do not wear tight shoes.  Wear support stockings.  Kegel exercises to prevent urine from leaking    Squeeze your muscles as if you were trying not to pass gas. Your belly, legs, and buttocks shouldn't move. Hold the squeeze for 3 seconds, then relax for 5 to 10 seconds.    Add 1 second each week until you can squeeze for 10 seconds. Repeat the exercise 10 times a session. Do 3 to 8 sessions a day. If these exercises cause you pain, stop doing them and talk with your doctor.  Follow-up care is a key part of your treatment and safety. Be sure to make and go to all appointments, and call your doctor if you are having problems. It's also a good idea to know your test results and keep a list of the medicines you take.  Where can you learn more?  Go to https://www.healthwise.net/patiented  Enter G264 in the search box to learn more about \"Weeks 22 to 26 of Your Pregnancy: Care Instructions.\"  Current as of: July 10, " 2023  Content Version: 14.2 2024 [facility] [employer].   Care instructions adapted under license by your healthcare professional. If you have questions about a medical condition or this instruction, always ask your healthcare professional. Healthwise[employer] Incorporated disclaims any warranty or liability for your use of this information.    Round Ligament Pain: Care Instructions  Overview     Round ligament pain is a common pain during pregnancy. You may feel a sharp brief pain on one or both sides of your belly. It may go down into your groin. It's usually felt for the first time during the second trimester. This pain is a normal part of pregnancy.  Your uterus is supported by two ligaments that go from the top and sides of the uterus to the bones of the pelvis. These are the round ligaments. As your uterus grows, these ligaments stretch and tighten with your movements. This may be the cause of the pain. You may find that certain activities seem to cause pain. If you can, avoid those activities.  Your doctor can usually diagnose round ligament pain from your symptoms and an exam. If you have bleeding or other symptoms, your doctor may also do an imaging test, such as an ultrasound. Your doctor may suggest some things that can help the pain, such as rest and strengthening exercises.  Follow-up care is a key part of your treatment and safety. Be sure to make and go to all appointments, and call your doctor if you are having problems. It's also a good idea to know your test results and keep a list of the medicines you take.  How can you care for yourself at home?  If certain movements seem to trigger belly pain, see if you can avoid them or try moving more slowly so the ligaments don't stretch quickly.  Stay active. If your doctor says it's okay, try moderate exercise. You might try things like swimming, walking, or stretching. Ask your doctor about strengthening and stretching exercises that may help.  Try a heating  "pad or cold pack on the area. A warm bath or shower may also help.  Rest when you can.  Ask your doctor about taking acetaminophen for pain. Be safe with medicines. Read and follow all instructions on the label.  Try a belly support band. Some people find that these can help.  When should you call for help?   Call your doctor now or seek immediate medical care if:    You think you might be in labor.     You have new or worse pain.   Watch closely for changes in your health, and be sure to contact your doctor if you have any problems.  Where can you learn more?  Go to https://www.[employer].net/patiented  Enter R110 in the search box to learn more about \"Round Ligament Pain: Care Instructions.\"  Current as of: July 10, 2023  Content Version: 14.2 2024 [employer].   Care instructions adapted under license by your healthcare professional. If you have questions about a medical condition or this instruction, always ask your healthcare professional. Healthwise, [employer] disclaims any warranty or liability for your use of this information.    Back Pain During Pregnancy: Care Instructions  Overview     Back pain has many possible causes. It is often caused by problems with muscles and ligaments in your back. The extra weight during pregnancy can put stress on your back. Moving, lifting, standing, sitting, or sleeping in an awkward way also can strain your back. Back pain can also be a sign of labor. Although it may hurt a lot, back pain often improves on its own. Use good home treatment, and take care not to stress your back.  Follow-up care is a key part of your treatment and safety. Be sure to make and go to all appointments, and call your doctor if you are having problems. It's also a good idea to know your test results and keep a list of the medicines you take.  How can you care for yourself at home?  Ask your doctor about taking acetaminophen (Tylenol) for pain. Do not take aspirin, ibuprofen (Advil, " Motrin), or naproxen (Aleve).  Do not take two or more pain medicines at the same time unless the doctor told you to. Many pain medicines have acetaminophen, which is Tylenol. Too much acetaminophen (Tylenol) can be harmful.  Try heat or ice, whichever feels better. Apply it for 10 to 20 minutes at a time, several times a day. Put a thin cloth between the heat or ice and your skin. A warm bath or shower may also help.  Lie on your left side with your knees and hips bent and a pillow between your legs. This reduces stress on your back.  Try to avoid standing or sitting for too long or heavy lifting. If your job requires lots of standing, sitting, or heavy lifting, ask your employer if you can take short breaks or adjust your work activity. You can ask your doctor to write a note requesting these breaks or other adjustments.  Wear supportive, low-heeled shoes. Avoid flat or high-heeled shoes.  Try a belly support band. Supporting your belly can take the strain off your back.  Ask your doctor about how much exercise you can do. Regular exercise such as swimming, water aerobics, walking, or stretching can help with back pain.  Ask your doctor about exercises to stretch, strengthen, and relax your muscles. Your doctor may recommend physical therapy.  When should you call for help?   Call your doctor now or seek immediate medical care if:    You've been having regular contractions for an hour. This means that you've had at least 6 contractions within 1 hour, even after you change your position and drink fluids.     You have new numbness in your buttocks, genital or rectal areas, or legs.     You have a new loss of bowel or bladder control.     You have symptoms of a urinary tract infection. These may include:  Pain or burning when you urinate.  A frequent need to urinate without being able to pass much urine.  Pain in the flank, which is just below the rib cage and above the waist on either side of the back.  Blood in  "your urine.   Watch closely for changes in your health, and be sure to contact your doctor if:    You do not get better as expected.   Where can you learn more?  Go to https://www.[employer].net/patiented  Enter C696 in the search box to learn more about \"Back Pain During Pregnancy: Care Instructions.\"  Current as of: July 10, 2023  Content Version: 14.2 2024 [facility] [employer].   Care instructions adapted under license by your healthcare professional. If you have questions about a medical condition or this instruction, always ask your healthcare professional. Healthwise, Incorporated disclaims any warranty or liability for your use of this information.    "

## 2024-12-09 ENCOUNTER — PRENATAL OFFICE VISIT (OUTPATIENT)
Dept: OBGYN | Facility: CLINIC | Age: 34
End: 2024-12-09
Payer: COMMERCIAL

## 2024-12-09 VITALS
DIASTOLIC BLOOD PRESSURE: 66 MMHG | HEART RATE: 87 BPM | OXYGEN SATURATION: 100 % | BODY MASS INDEX: 20.32 KG/M2 | HEIGHT: 64 IN | WEIGHT: 119 LBS | SYSTOLIC BLOOD PRESSURE: 107 MMHG

## 2024-12-09 DIAGNOSIS — Z34.82 SUPERVISION OF NORMAL IUP (INTRAUTERINE PREGNANCY) IN MULTIGRAVIDA, SECOND TRIMESTER: Primary | ICD-10-CM

## 2024-12-09 DIAGNOSIS — Z12.4 CERVICAL CANCER SCREENING: ICD-10-CM

## 2024-12-09 PROCEDURE — G2211 COMPLEX E/M VISIT ADD ON: HCPCS | Performed by: OBSTETRICS & GYNECOLOGY

## 2024-12-09 PROCEDURE — 99459 PELVIC EXAMINATION: CPT | Performed by: OBSTETRICS & GYNECOLOGY

## 2024-12-09 PROCEDURE — 87624 HPV HI-RISK TYP POOLED RSLT: CPT | Performed by: OBSTETRICS & GYNECOLOGY

## 2024-12-09 PROCEDURE — G0145 SCR C/V CYTO,THINLAYER,RESCR: HCPCS | Performed by: OBSTETRICS & GYNECOLOGY

## 2024-12-09 PROCEDURE — 99203 OFFICE O/P NEW LOW 30 MIN: CPT | Performed by: OBSTETRICS & GYNECOLOGY

## 2024-12-09 ASSESSMENT — PATIENT HEALTH QUESTIONNAIRE - PHQ9: SUM OF ALL RESPONSES TO PHQ QUESTIONS 1-9: 1

## 2024-12-09 NOTE — PROGRESS NOTES
INITIAL OB ASSESSMENT............................................Date: 2024                            ---------------------    Name: Marizol Craig.......................................................................Plan Number: [id number]    Age: 33 year old   : 1990  Phone: [phone] (home)   Address: [address] [place] [address]    Marital Status:    Race/Ethnicity:   Occupation:  stay at home mother  Partner's Name:  Umesh , Partner's Occupation:  [employer]      OB Physician: Ivan Francois MD       LMP:  Patient's LMP from OB Dating Form:  Patient's last menstrual period was 2024 (approximate).  Regular menses? yes  Menses every month.   Length of menses: 4 days      ULTRASOUND OBSTETRIC LESS THAN FOURTEEN WEEKS SINGLE, TRANSABDOMINAL IMAGING  2024 10:50 AM  HISTORY: Prenatal care, subsequent pregnancy, unspecified trimester.  COMPARISON: None.  FINDINGS: There is a single live intrauterine pregnancy of  approximately 9 weeks 3 days gestation. The EDC based on this  ultrasound is 6/3/2025. Fetal heart rate is 172 beats per minute.  Fetal anatomic detail is limited due to early gestational age with no  gross abnormality seen. Amniotic fluid is unable to be assessed due to  early gestational age. Placenta is not yet adequately visible due to  early gestational age.   Maternal adnexa: Right ovary obscured by gestation. Left ovary appears  normal.  Patient reported LMP: 2024  LMP gestational age: 12 weeks 5 days                                                            IMPRESSION:   1. Early single live intrauterine pregnancy of approximately 9 weeks 3  days gestation. EDC is 6/3/2025.  2. Right ovary is obscured for assessment.        Obstetrical History  ===================  OB History    Para Term  AB Living   4 3 3 0 0 3   SAB IAB Ectopic Multiple Live Births   0 0 0 0 3      # Outcome Date GA  Lbr Michael/2nd Weight Sex Type Anes PTL Lv   4 Current            3 Term 10/14/19 38w2d 05:53 / 00:39 3.629 kg (8 lb) M Vag-Spont EPI N LINDSAY      Name: SHERRONBABY BOY      Apgar1: 9  Apgar5: 9   2 Term 05/25/17 39w2d 05:40 / 00:27 3.374 kg (7 lb 7 oz) F Vag-Spont EPI N LINDSAY      Name: SHERRONBABY GIRL      Apgar1: 8  Apgar5: 9   1 Term 11/11/14 38w0d 12:00 / 01:00 3.118 kg (6 lb 14 oz) F Vag-Vacuum EPI  LINDSAY      Name: Sarah       Past Medical History:  Past Medical History:   Diagnosis Date    NO ACTIVE PROBLEMS (aka NONE)        Past Surgical History:  Past Surgical History:   Procedure Laterality Date    HC CLOSED TX NASAL BONE FRACTURE W/O STABILIZATION  2005       Current Outpatient Medications   Medication Sig Dispense Refill    Prenatal Vit-Fe Fumarate-FA (PRENATAL MULTIVITAMIN W/IRON) 27-0.8 MG tablet Take 1 tablet by mouth         No Known Allergies    Social History     Socioeconomic History    Marital status:      Spouse name: Umesh    Number of children: 3    Years of education: Not on file    Highest education level: Not on file   Occupational History    Occupation: homemaker   Tobacco Use    Smoking status: Never     Passive exposure: Never    Smokeless tobacco: Never   Vaping Use    Vaping status: Never Used   Substance and Sexual Activity    Alcohol use: No    Drug use: No    Sexual activity: Yes     Partners: Male     Birth control/protection: None   Other Topics Concern    Parent/sibling w/ CABG, MI or angioplasty before 65F 55M? Not Asked   Social History Narrative    From [facility] to [place] in [place].     Social Drivers of Health     Financial Resource Strain: Not on file   Food Insecurity: Not on file   Transportation Needs: Not on file   Physical Activity: Not on file   Stress: Not on file   Social Connections: Not on file   Interpersonal Safety: Not on file   Housing Stability: Not on file     , Children  No substance abuse, environmental exposures, mental health risks and No financial  "concerns,pets. Good partner support.       Family History   Problem Relation Age of Onset    No Known Problems Mother     No Known Problems Father     Coronary Artery Disease Maternal Grandfather        Past Medical History of Father of Baby:   No significant medical history     No known genetic disease in patient's 1st or 2nd degree relatives  No known genetic diseases in the FOB's 1st or 2nd degree relatives      REVIEW OF SYMPTOMS:   History Since Last Menstrual Period:    nausea, vomiting, fatigue, and breast tenderness       PHYSICAL EXAM:  /66   Pulse 87   Ht 1.626 m (5' 4\")   Wt 54 kg (119 lb)   LMP 2024 (Approximate)   SpO2 100%   Breastfeeding No   BMI 20.43 kg/m    General:  WNWD female, NAD  Oriented:  X 3  Alert  PSYCH:  mentation appears normal., affect and mood normal  HEENT:  NC/AT, EOMI  NECK:  Neck supple. No adenopathy. Thyroid symmetric, normal size,, Carotids without bruits.  HEART:  RRR  LUNGS:  Clear to auscultation.  Good respiratory effort  BREASTS: not performed.   ABDOMEN: Benign, Soft, flat, non-tender, No masses, organomegaly, and Bowel sounds normoactive FHT's heard   VULVA: no masses or lesions seen  BUS:  B[place]olin's, Urethra, [place]'s normal  VAGINA:  No masses or lesions seen.   CERVIX:  Parous, closed, mobile, no discharge.  No bleeding seen with the pap smear.   UTERUS:  per ultrasound   ADNEXA:  per ultrasound   EXTREMITIES:No cyanosis, clubbing, warm and well perfused and No edema   GAIT: normal including tandem walk, heel and toe walk.        Assessment:   IUP at 14.6 weeks gestation  Supervision of normal multigravida in second trimester  Encounter for pap smear screen        Plan:  Options for  testing for chromosomal and birth defects were discussed with the patient.  Diagnostic tests include CVS and Amniocentesis.  We discussed that these tests are definitive but invasive and do carry a risk of fetal loss.    Screening tests include nuchal " translucency/blood marker testing in the first trimester and quad screening in the second trimester.  We discussed that these are screening tests and not diagnostic tests and that false positives and negatives are a distinct possibility.  The patient will discuss and decide.  She will call and discuss with her insurance coverage for co-pay.  Then she will call us and I will be able to place the order in for her if she desires to proceed.    The AFP would also need to be done, preferably between 16 and 18 weeks.   We discussed physician coverage, tertiary support, diet, exercise, weight gain, schedule of visits, routine and indicated ultrasounds, and childbirth education.   Discussed aspirin use in pregnancy.  Low-dose aspirin prophylaxis can be beneficial in women at high risk of developing preeclampsia.  I generally recommend we begin aspirin at about 12-13 weeks gestation and continue until at least 36 weeks.  Women with at least one of the following conditions are considered high risk for developing preeclampsia: Previous pregnancy with preeclampsia,  multifetal-gestation, chronic hypertension, diabetes mellitus, chronic kidney disease, autoimmune disease.  Women with more than 1 of the following conditions may also consider low-dose aspirin prophylaxis in pregnancy: Nulliparity, BMI greater than 30, family history of preeclampsia (mother or sister), AMA, socio-demographic characteristics, personal risk factors.    While she does not have risks for pre-eclampsia, we discussed the risks and benefits of low dose Asprin therapy and she will think about these.   Labs were to be done today, but patient did not want them done and she left without the blood drawn labs.   Patient consents to the pap smear today.    RTC 4 weeks    Ivan Francois MD

## 2024-12-10 LAB
HPV HR 12 DNA CVX QL NAA+PROBE: NEGATIVE
HPV16 DNA CVX QL NAA+PROBE: NEGATIVE
HPV18 DNA CVX QL NAA+PROBE: NEGATIVE
HUMAN PAPILLOMA VIRUS FINAL DIAGNOSIS: NORMAL

## 2024-12-11 LAB
BKR AP ASSOCIATED HPV REPORT: NORMAL
BKR LAB AP GYN ADEQUACY: NORMAL
BKR LAB AP GYN INTERPRETATION: NORMAL
BKR LAB AP PREVIOUS ABNORMAL: NORMAL
PATH REPORT.COMMENTS IMP SPEC: NORMAL
PATH REPORT.COMMENTS IMP SPEC: NORMAL
PATH REPORT.RELEVANT HX SPEC: NORMAL

## 2025-01-15 ENCOUNTER — ANCILLARY PROCEDURE (OUTPATIENT)
Dept: ULTRASOUND IMAGING | Facility: CLINIC | Age: 35
End: 2025-01-15
Attending: OBSTETRICS & GYNECOLOGY
Payer: COMMERCIAL

## 2025-01-15 DIAGNOSIS — Z34.82 SUPERVISION OF NORMAL IUP (INTRAUTERINE PREGNANCY) IN MULTIGRAVIDA, SECOND TRIMESTER: ICD-10-CM

## 2025-01-15 PROCEDURE — 76805 OB US >/= 14 WKS SNGL FETUS: CPT | Performed by: RADIOLOGY

## 2025-03-06 ENCOUNTER — MYC MEDICAL ADVICE (OUTPATIENT)
Dept: OBGYN | Facility: CLINIC | Age: 35
End: 2025-03-06
Payer: COMMERCIAL

## 2025-03-06 ENCOUNTER — TELEPHONE (OUTPATIENT)
Dept: OBGYN | Facility: CLINIC | Age: 35
End: 2025-03-06
Payer: COMMERCIAL

## 2025-03-06 NOTE — TELEPHONE ENCOUNTER
Closing encounter as pt responded to the [employer] message.  See 3/6 mychart encounter for further triage/communication.    Cindy Perez RN-[facility]

## 2025-03-06 NOTE — TELEPHONE ENCOUNTER
Caller reporting the following red-flag symptom(s): pt is 27w2d and has pretty intense leg pain, she has concerns of a blood clot     Per the system red-flag symptom policy, patient was instructed to:  speak with a Registered Nurse    Action:  Message sent to the clinic  Writer unable to reach RF or secure chat, please call pt back asap, thank you

## 2025-03-06 NOTE — TELEPHONE ENCOUNTER
Sent pt a mychart message triaging her symptoms even though she only wanted the  to put it on her appt notes for Monday.    Cindy Perez RN- [facility]

## 2025-03-09 LAB
ABO + RH BLD: NORMAL
BLD GP AB SCN SERPL QL: NEGATIVE
SPECIMEN EXP DATE BLD: NORMAL

## 2025-03-10 ENCOUNTER — PRENATAL OFFICE VISIT (OUTPATIENT)
Dept: OBGYN | Facility: CLINIC | Age: 35
End: 2025-03-10
Payer: COMMERCIAL

## 2025-03-10 ENCOUNTER — MYC MEDICAL ADVICE (OUTPATIENT)
Dept: OBGYN | Facility: CLINIC | Age: 35
End: 2025-03-10

## 2025-03-10 VITALS
SYSTOLIC BLOOD PRESSURE: 110 MMHG | WEIGHT: 136.8 LBS | DIASTOLIC BLOOD PRESSURE: 65 MMHG | HEART RATE: 90 BPM | BODY MASS INDEX: 23.48 KG/M2

## 2025-03-10 DIAGNOSIS — Z34.82 SUPERVISION OF NORMAL IUP (INTRAUTERINE PREGNANCY) IN MULTIGRAVIDA, SECOND TRIMESTER: Primary | ICD-10-CM

## 2025-03-10 DIAGNOSIS — Z34.80 SUPERVISION OF OTHER NORMAL PREGNANCY, ANTEPARTUM: ICD-10-CM

## 2025-03-10 DIAGNOSIS — O22.00 VARICOSE VEINS DURING PREGNANCY: ICD-10-CM

## 2025-03-10 LAB
ALBUMIN UR-MCNC: NEGATIVE MG/DL
APPEARANCE UR: CLEAR
BILIRUB UR QL STRIP: NEGATIVE
COLOR UR AUTO: ABNORMAL
ERYTHROCYTE [DISTWIDTH] IN BLOOD BY AUTOMATED COUNT: 12.5 % (ref 10–15)
EST. AVERAGE GLUCOSE BLD GHB EST-MCNC: 111 MG/DL
GLUCOSE UR STRIP-MCNC: 30 MG/DL
HBA1C MFR BLD: 5.5 % (ref 0–5.6)
HBV SURFACE AG SERPL QL IA: NONREACTIVE
HCT VFR BLD AUTO: 33.4 % (ref 35–47)
HCV AB SERPL QL IA: NONREACTIVE
HGB BLD-MCNC: 11 G/DL (ref 11.7–15.7)
HGB UR QL STRIP: NEGATIVE
HIV 1+2 AB+HIV1 P24 AG SERPL QL IA: NONREACTIVE
KETONES UR STRIP-MCNC: NEGATIVE MG/DL
LEUKOCYTE ESTERASE UR QL STRIP: NEGATIVE
MCH RBC QN AUTO: 28.3 PG (ref 26.5–33)
MCHC RBC AUTO-ENTMCNC: 32.9 G/DL (ref 31.5–36.5)
MCV RBC AUTO: 86 FL (ref 78–100)
NITRATE UR QL: NEGATIVE
PH UR STRIP: 6.5 [PH] (ref 5–7)
PLATELET # BLD AUTO: 173 10E3/UL (ref 150–450)
RBC # BLD AUTO: 3.89 10E6/UL (ref 3.8–5.2)
RUBV IGG SERPL QL IA: 1.88 INDEX
RUBV IGG SERPL QL IA: POSITIVE
SP GR UR STRIP: 1.02 (ref 1–1.03)
T PALLIDUM AB SER QL: NONREACTIVE
UROBILINOGEN UR STRIP-MCNC: NORMAL MG/DL
WBC # BLD AUTO: 8.7 10E3/UL (ref 4–11)

## 2025-03-10 PROCEDURE — 81003 URINALYSIS AUTO W/O SCOPE: CPT | Performed by: OBSTETRICS & GYNECOLOGY

## 2025-03-10 PROCEDURE — 99207 PR PRENATAL VISIT: CPT | Performed by: OBSTETRICS & GYNECOLOGY

## 2025-03-10 PROCEDURE — 86850 RBC ANTIBODY SCREEN: CPT | Performed by: OBSTETRICS & GYNECOLOGY

## 2025-03-10 PROCEDURE — 87340 HEPATITIS B SURFACE AG IA: CPT | Performed by: OBSTETRICS & GYNECOLOGY

## 2025-03-10 PROCEDURE — 85027 COMPLETE CBC AUTOMATED: CPT | Performed by: OBSTETRICS & GYNECOLOGY

## 2025-03-10 PROCEDURE — 83036 HEMOGLOBIN GLYCOSYLATED A1C: CPT | Performed by: OBSTETRICS & GYNECOLOGY

## 2025-03-10 PROCEDURE — 0502F SUBSEQUENT PRENATAL CARE: CPT | Performed by: OBSTETRICS & GYNECOLOGY

## 2025-03-10 PROCEDURE — 3078F DIAST BP <80 MM HG: CPT | Performed by: OBSTETRICS & GYNECOLOGY

## 2025-03-10 PROCEDURE — 36415 COLL VENOUS BLD VENIPUNCTURE: CPT | Performed by: OBSTETRICS & GYNECOLOGY

## 2025-03-10 PROCEDURE — 86762 RUBELLA ANTIBODY: CPT | Performed by: OBSTETRICS & GYNECOLOGY

## 2025-03-10 PROCEDURE — 86901 BLOOD TYPING SEROLOGIC RH(D): CPT | Performed by: OBSTETRICS & GYNECOLOGY

## 2025-03-10 PROCEDURE — 86803 HEPATITIS C AB TEST: CPT | Performed by: OBSTETRICS & GYNECOLOGY

## 2025-03-10 PROCEDURE — 87389 HIV-1 AG W/HIV-1&-2 AB AG IA: CPT | Performed by: OBSTETRICS & GYNECOLOGY

## 2025-03-10 PROCEDURE — 86780 TREPONEMA PALLIDUM: CPT | Performed by: OBSTETRICS & GYNECOLOGY

## 2025-03-10 PROCEDURE — 3074F SYST BP LT 130 MM HG: CPT | Performed by: OBSTETRICS & GYNECOLOGY

## 2025-03-10 PROCEDURE — 86900 BLOOD TYPING SEROLOGIC ABO: CPT | Performed by: OBSTETRICS & GYNECOLOGY

## 2025-03-10 PROCEDURE — 87086 URINE CULTURE/COLONY COUNT: CPT | Performed by: OBSTETRICS & GYNECOLOGY

## 2025-03-10 NOTE — PROGRESS NOTES
Presents for routine  appointment.  This is her 2nd prenatal visit Last visit was the initial OB visit done 24, 4 months ago, at 14w 6d.  She is dated by an early US. She has yet to do her prenatal labs.    Screening US done at about 20 weeks reviewed.      No leaking fluid, no contractions, no vaginal bleeding    ROS:   and GI negative.     Please see Prenatal Vitals and Notes Flowsheet for objective data.    Ext:  SCDs (knee highs) in place.  Trace edema bilaterally. Temperature equal bilaterally.      A/P:  34 year old  at 27w6d       ICD-10-CM    1. Supervision of normal IUP (intrauterine pregnancy) in multigravida, second trimester  Z34.82 Glucose tolerance, gest screen, 1 hour      2. Supervision of other normal pregnancy, antepartum  Z34.80       3. Varicose veins during pregnancy  O22.00 Compression Sleeve/Stocking Order for DME - ONLY FOR DME          GCT due today, with prenatal labs. She cannot do the 1 hour, so she will hold off on that until next visit.   TDAP declined.     Discussed PTL and kick counts  Reviewed varicose veins, no evidence of DVT at this time, but patient will continue to monitor  and will go to ED.  Reportable signs and symptoms discussed.   Follow up in 2 weeks.      Chhaya Olson MD

## 2025-03-10 NOTE — PATIENT INSTRUCTIONS
SIGNS OF  LABOR    Labor is  if it happens more than three weeks before your due date.    It can be hard to know if you are in labor, since the symptoms can be like the normal feelings of pregnancy.  Often, the only difference is the symptoms increase or they don't go away.     Signs of  labor can include:    Change in your vaginal discharge:  You will have more vaginal discharge when you are pregnant and it should be creamy white.  Call the clinic right away if your discharge has a foul odor, pink or bloody,  or if it becomes watery or is more than is normal for you during your pregnancy.    More than 5-6 contractions or tightenings per hour.  Contractions can feel like period cramps or bowel (gas or diarrhea) pain.  You will feel it in the lower part of your abdomen, in your back or as a pressure feeling in your bottom.  It is often regular, coming for 30 seconds or a minute and then going away, only to come back 5 or 10 minutes later. Some contractions are normal during pregnancy, but if you are feeling more than 5-6 in one hour, empty your bladder, then drink 16-24 ounces of water, eat a snack and lay down on your left side. Put your hand on your abdomen to count the contractions.  If after one hour of resting you have still had 5-6 contractions call your clinic.

## 2025-03-12 LAB — BACTERIA UR CULT: NORMAL

## 2025-03-17 ENCOUNTER — TELEPHONE (OUTPATIENT)
Dept: OBGYN | Facility: OTHER | Age: 35
End: 2025-03-17
Payer: COMMERCIAL

## 2025-03-17 NOTE — TELEPHONE ENCOUNTER
Calling with c/o cramping.     28w6d    Last prenatal appointment 3/10/2025  Next prenatal appointment 2025    Reports intermittent cramping in right hip lower & right lower abdominal area just above the right hip.    States this has been going on for a while, never reported to the provider- happening more frequent now.    Drinking about 33-34 ounces of water per day.   No decreased fetal movement, no spotting or other symptoms.     Advises, increase water intake to at least  ounces water a day, can try warm compress on the area and ok to take some tylenol.  Reach back out if increasing water intake, warm compress do not help.  Also reach back out with any new or worsening symptoms.     Routing to provider for any additional recommendations.     Vee BARNARD RN - [facility]

## 2025-03-31 NOTE — PATIENT INSTRUCTIONS
If you wish to schedule another appointment, please call our office at [phone]. You can also make appointments through [employer]  Return to clinic:     Every 4 weeks till 28 weeks, then every 2 weeks till 36 weeks, then weekly till delivery    If anything comes up between your visits or you have concerns, please don't hesitate to contact us.                                               If you have labs or imaging done, the results will automatically release in [employer] without an interpretation.  Your health care professional will review those results and send an interpretation with recommendations as soon as possible, but this may be 1-3 business days.    If you have any questions regarding your visit, please contact your care team.     [employer] [facility]: [phone]  Women s Health CLINIC HOURS TELEPHONE NUMBER   Rani ParikhDODonny    Monica -Surgery Scheduler  Michelle -     BLAISE White, BLAISE Velásquez RN   [place]    Monday 8:30 am-5:00 pm  Wednesday 8:30 am-5:00 pm  Friday 8:30 am-5:00 pm    Typical Surgery day: Tuesday [facility]  [address]  [place] [address]  Phone:  [phone]  Fax: [phone]   Appointment [phone]    Imaging Scheduling-All Locations [phone]    [facility] Labor and Delivery  [address] [place] [address]   [place] [address] [phone]   **Surgeries** Our Surgery Schedulers will contact you to schedule. If you do not receive a call within 3 business days, please call [phone].  Urgent Care locations:  [facility] Monday-Friday   10 am - 8 pm  Saturday and    9 am - 5 pm [phone]   If you need a medication refill, please contact your pharmacy. Please allow 3 business days for your refill to be completed.  As always, Thank you for trusting us with your healthcare needs!  see additional instructions from your care team below

## 2025-04-02 ENCOUNTER — PRENATAL OFFICE VISIT (OUTPATIENT)
Dept: OBGYN | Facility: CLINIC | Age: 35
End: 2025-04-02
Payer: COMMERCIAL

## 2025-04-02 VITALS
HEART RATE: 90 BPM | BODY MASS INDEX: 23.86 KG/M2 | OXYGEN SATURATION: 95 % | WEIGHT: 139 LBS | DIASTOLIC BLOOD PRESSURE: 67 MMHG | SYSTOLIC BLOOD PRESSURE: 102 MMHG

## 2025-04-02 DIAGNOSIS — Z34.83 ENCOUNTER FOR SUPERVISION OF OTHER NORMAL PREGNANCY IN THIRD TRIMESTER: Primary | ICD-10-CM

## 2025-04-02 PROCEDURE — 0502F SUBSEQUENT PRENATAL CARE: CPT | Performed by: OBSTETRICS & GYNECOLOGY

## 2025-04-02 PROCEDURE — 99207 PR PRENATAL VISIT: CPT | Performed by: OBSTETRICS & GYNECOLOGY

## 2025-04-02 PROCEDURE — 3074F SYST BP LT 130 MM HG: CPT | Performed by: OBSTETRICS & GYNECOLOGY

## 2025-04-02 PROCEDURE — 3078F DIAST BP <80 MM HG: CPT | Performed by: OBSTETRICS & GYNECOLOGY

## 2025-04-02 NOTE — PROGRESS NOTES
She reports feeling reassuring daily fetal activity and will continue to record.  She gained 2 lbs since her last visit and denies any fluid leakage or regular uterine contractions.  She has not had the 1-hour GTT collected so I encouraged her to have this done soon since overdue.  However, she stated that Dr. Frost did not require this so she is declining to have this drawn during her current pregnancy.  She was encouraged to take an iron supplement due to mild anemia with a recent hgb value of 11.0 on 3/10/2025.  She is not planning to have additional children but is ot interested in a PPTL.  She is not a rhogam candidate since her Rh factor is +.

## 2025-04-21 NOTE — PATIENT INSTRUCTIONS
If you wish to schedule another appointment, please call our office at [phone]. You can also make appointments through [employer]  Return to clinic:     Every 4 weeks till 28 weeks, then every 2 weeks till 36 weeks, then weekly till delivery    If anything comes up between your visits or you have concerns, please don't hesitate to contact us.                                               If you have labs or imaging done, the results will automatically release in [employer] without an interpretation.  Your health care professional will review those results and send an interpretation with recommendations as soon as possible, but this may be 1-3 business days.    If you have any questions regarding your visit, please contact your care team.     [employer] [facility]: [phone]  Women s Health CLINIC HOURS TELEPHONE NUMBER   Rani ParikhDODonny    Monica -Surgery Scheduler  Michelle -     BLAISE White, BLAISE Velásquez RN   [place]    Monday 8:30 am-5:00 pm  Wednesday 8:30 am-5:00 pm  Friday 8:30 am-5:00 pm    Typical Surgery day: Tuesday [facility]  [address]  [place] [address]  Phone:  [phone]  Fax: [phone]   Appointment [phone]    Imaging Scheduling-All Locations [phone]    [facility] Labor and Delivery  [address] [place] [address]   [place] [address] [phone]   **Surgeries** Our Surgery Schedulers will contact you to schedule. If you do not receive a call within 3 business days, please call [phone].  Urgent Care locations:  [facility] Monday-Friday   10 am - 8 pm  Saturday and    9 am - 5 pm [phone]   If you need a medication refill, please contact your pharmacy. Please allow 3 business days for your refill to be completed.  As always, Thank you for trusting us with your healthcare needs!  see additional instructions from your care team below

## 2025-04-23 ENCOUNTER — PRENATAL OFFICE VISIT (OUTPATIENT)
Dept: OBGYN | Facility: CLINIC | Age: 35
End: 2025-04-23
Payer: COMMERCIAL

## 2025-04-23 VITALS
BODY MASS INDEX: 24.55 KG/M2 | WEIGHT: 143 LBS | OXYGEN SATURATION: 98 % | HEART RATE: 88 BPM | DIASTOLIC BLOOD PRESSURE: 72 MMHG | SYSTOLIC BLOOD PRESSURE: 110 MMHG

## 2025-04-23 DIAGNOSIS — Z34.83 ENCOUNTER FOR SUPERVISION OF OTHER NORMAL PREGNANCY IN THIRD TRIMESTER: Primary | ICD-10-CM

## 2025-04-23 DIAGNOSIS — M54.31 SCIATIC NERVE PAIN, RIGHT: ICD-10-CM

## 2025-04-23 PROCEDURE — 3074F SYST BP LT 130 MM HG: CPT | Performed by: OBSTETRICS & GYNECOLOGY

## 2025-04-23 PROCEDURE — 99207 PR PRENATAL VISIT: CPT | Performed by: OBSTETRICS & GYNECOLOGY

## 2025-04-23 PROCEDURE — 0502F SUBSEQUENT PRENATAL CARE: CPT | Performed by: OBSTETRICS & GYNECOLOGY

## 2025-04-23 PROCEDURE — 3078F DIAST BP <80 MM HG: CPT | Performed by: OBSTETRICS & GYNECOLOGY

## 2025-04-24 NOTE — PROGRESS NOTES
She reports feeling reassuring daily fetal activity and will continue to record.  She gained 4 lbs since her last visit and denies any fluid leakage or regular uterine contractions.  She c/o right-sided sciatic nerve pain so will refer to PT if the maternity band/girdle fails to help.  She declines the 1-hour GTT.

## 2025-05-08 NOTE — PATIENT INSTRUCTIONS
If you wish to schedule another appointment, please call our office at [phone]. You can also make appointments through [employer]  Return to clinic:     Every 4 weeks till 28 weeks, then every 2 weeks till 36 weeks, then weekly till delivery    If anything comes up between your visits or you have concerns, please don't hesitate to contact us.                                                 If you have labs or imaging done, the results will automatically release in [employer] without an interpretation.  Your health care professional will review those results and send an interpretation with recommendations as soon as possible, but this may be 1-3 business days.    If you have any questions regarding your visit, please contact your care team.     [employer] [facility]: [phone]  Women s Health CLINIC HOURS TELEPHONE NUMBER   Rani ParikhDODonny    Monica -Surgery Scheduler  Michelle -     BLAISE White, BLAISE Velásquez RN   [place]    Monday 8:30 am-5:00 pm  Wednesday 8:30 am-5:00 pm  Friday 8:30 am-5:00 pm    Typical Surgery day: Tuesday [facility]  [address]  [place] [address]  Phone:  [phone]  Fax: [phone]   Appointment [phone]    Imaging Scheduling-All Locations [phone]    [facility] Labor and Delivery  [address] [place] [address]   [place] [address] [phone]   **Surgeries** Our Surgery Schedulers will contact you to schedule. If you do not receive a call within 3 business days, please call [phone].  Urgent Care locations:  [facility] Monday-Friday   10 am - 8 pm  Saturday and    9 am - 5 pm [phone]   If you need a medication refill, please contact your pharmacy. Please allow 3 business days for your refill to be completed.  As always, Thank you for trusting us with your healthcare needs!  see additional instructions from your care team below

## 2025-05-12 ENCOUNTER — PRENATAL OFFICE VISIT (OUTPATIENT)
Dept: OBGYN | Facility: CLINIC | Age: 35
End: 2025-05-12
Attending: OBSTETRICS & GYNECOLOGY
Payer: COMMERCIAL

## 2025-05-12 VITALS — DIASTOLIC BLOOD PRESSURE: 73 MMHG | BODY MASS INDEX: 25.3 KG/M2 | SYSTOLIC BLOOD PRESSURE: 109 MMHG | WEIGHT: 147.4 LBS

## 2025-05-12 DIAGNOSIS — Z34.83 ENCOUNTER FOR SUPERVISION OF OTHER NORMAL PREGNANCY IN THIRD TRIMESTER: Primary | ICD-10-CM

## 2025-05-12 PROCEDURE — 99207 PR PRENATAL VISIT: CPT | Performed by: OBSTETRICS & GYNECOLOGY

## 2025-05-12 PROCEDURE — 3078F DIAST BP <80 MM HG: CPT | Performed by: OBSTETRICS & GYNECOLOGY

## 2025-05-12 PROCEDURE — 3074F SYST BP LT 130 MM HG: CPT | Performed by: OBSTETRICS & GYNECOLOGY

## 2025-05-12 PROCEDURE — 0502F SUBSEQUENT PRENATAL CARE: CPT | Performed by: OBSTETRICS & GYNECOLOGY

## 2025-05-12 PROCEDURE — 87653 STREP B DNA AMP PROBE: CPT | Performed by: OBSTETRICS & GYNECOLOGY

## 2025-05-12 NOTE — PROGRESS NOTES
Presents for routine  appointment.    No leaking fluid, no contractions, no vaginal bleeding   ROS:   and GI negative.     Please see Prenatal Vitals and Notes Flowsheet for objective data.  Wt 66.9 kg (147 lb 6.4 oz)   LMP 2024 (Approximate)   BMI 25.30 kg/m       A/P:  34 year old  at 36w6d       ICD-10-CM    1. Encounter for supervision of other normal pregnancy in third trimester  Z34.83 Group B strep PCR          Group B Strep collected today  GCT declined  Discussed labor and what to expect. Discussed when to go to the birth center.  Cervix: /-2  Follow up in 1 week.      Chhaya Olson MD

## 2025-05-13 LAB — GP B STREP DNA SPEC QL NAA+PROBE: POSITIVE

## 2025-05-14 ENCOUNTER — RESULTS FOLLOW-UP (OUTPATIENT)
Dept: OBGYN | Facility: OTHER | Age: 35
End: 2025-05-14

## 2025-05-21 ENCOUNTER — MYC MEDICAL ADVICE (OUTPATIENT)
Dept: OBGYN | Facility: CLINIC | Age: 35
End: 2025-05-21

## 2025-05-21 ENCOUNTER — PRENATAL OFFICE VISIT (OUTPATIENT)
Dept: OBGYN | Facility: CLINIC | Age: 35
End: 2025-05-21
Payer: COMMERCIAL

## 2025-05-21 VITALS
OXYGEN SATURATION: 96 % | HEART RATE: 82 BPM | BODY MASS INDEX: 25.75 KG/M2 | SYSTOLIC BLOOD PRESSURE: 130 MMHG | DIASTOLIC BLOOD PRESSURE: 83 MMHG | WEIGHT: 150 LBS

## 2025-05-21 DIAGNOSIS — Z34.83 ENCOUNTER FOR SUPERVISION OF OTHER NORMAL PREGNANCY IN THIRD TRIMESTER: Primary | ICD-10-CM

## 2025-05-21 PROCEDURE — 3079F DIAST BP 80-89 MM HG: CPT | Performed by: OBSTETRICS & GYNECOLOGY

## 2025-05-21 PROCEDURE — 3075F SYST BP GE 130 - 139MM HG: CPT | Performed by: OBSTETRICS & GYNECOLOGY

## 2025-05-21 PROCEDURE — 0502F SUBSEQUENT PRENATAL CARE: CPT | Performed by: OBSTETRICS & GYNECOLOGY

## 2025-05-21 PROCEDURE — 99207 PR PRENATAL VISIT: CPT | Performed by: OBSTETRICS & GYNECOLOGY

## 2025-05-21 NOTE — PROGRESS NOTES
She reports feeling reassuring daily fetal activity and will continue to record.  She gained 3 lbs since her last visit and denies any fluid leakage or regular uterine contractions.  We discussed her +GBS carrier status since she had not looked on My Chart for this result and message.  She wanted a printed copy of the result plus she was provided written information on this and the need for IV antibiotic for treatment during labor.  We also discussed the option of induction at 39 weeks gestation since she typically has rapid labors < 4 hours long but she wants to think this over first.

## 2025-05-21 NOTE — PATIENT INSTRUCTIONS
If you have labs or imaging done, the results will automatically release in [employer] without an interpretation.  Your health care professional will review those results and send an interpretation with recommendations as soon as possible, but this may be 1-3 business days.    If you have any questions regarding your visit, please contact your care team.     [employer] [facility]: [phone]  Women s Health CLINIC HOURS TELEPHONE NUMBER   DO. Monica Eaton -Surgery Scheduler  Michelle -     BLAISE White RN Kylie, RN [place]    Monday 8:30 am-5:00 pm  Wednesday 8:30 am-5:00 pm  Friday 8:30 am-5:00 pm    Typical Surgery day: Tuesday [facility]  [address] [place] [address]  Phone:  [phone]  Fax: [phone]   Appointment [phone]    Imaging Scheduling-All Locations [phone]    [facility] Labor and Delivery  [address] [place] [address] [place] [address] [phone]   **Surgeries** Our Surgery Schedulers will contact you to schedule. If you do not receive a call within 3 business days, please call [phone].  Urgent Care locations:  [facility] Monday-Friday   10 am - 8 pm  Saturday and    9 am - 5 pm [phone]   If you need a medication refill, please contact your pharmacy. Please allow 3 business days for your refill to be completed.  As always, Thank you for trusting us with your healthcare needs!  see additional instructions from your care team below    Group B Strep During Pregnancy: Care Instructions  Overview     Group B strep infection is caused by a type of bacteria. It's a different kind of bacteria than the kind that causes strep throat.  You may have this kind of bacteria in your body. Sometimes it may cause an infection, but most of the time it doesn't make you sick or cause symptoms. But if you pass the bacteria to  your baby during the birth, it can cause serious health problems for your baby.  If you have this bacteria in your body, you will get antibiotics when you are in labor. Antibiotics help prevent problems for a  baby.  After birth, doctors will watch and may test your baby. If your baby tests positive for Group B strep, your baby will get antibiotics.  If you plan to breastfeed your baby, don't worry. It will be safe to breastfeed.  Follow-up care is a key part of your treatment and safety. Be sure to make and go to all appointments, and call your doctor if you are having problems. It's also a good idea to know your test results and keep a list of the medicines you take.  How can you care for yourself at home?  If your doctor has prescribed antibiotics, take them as directed. Do not stop taking them just because you feel better. You need to take the full course of antibiotics.  Tell your doctor if you are allergic to any antibiotic.  If you go into labor, or your water breaks, go to the hospital. Your doctor will give you antibiotics to help protect your baby from infection.  Tell the doctors and nurses if you have an allergy to penicillin.  Tell the doctors and nurses at the hospital that you tested positive for group B strep.  When should you call for help?   Call your doctor now or seek immediate medical care if:    You have symptoms of a urinary tract infection. These may include:  Pain or burning when you urinate.  A frequent need to urinate without being able to pass much urine.  Pain in the flank, which is just below the rib cage and above the waist on either side of the back.  Blood in your urine.  A fever.     You think you are in labor or your water has broken.     You have pain in your belly or pelvis.   Watch closely for changes in your health, and be sure to contact your doctor if you have any problems.  Where can you learn more?  Go to https://www.healthwise.net/patiented  Enter M001 in the search  "box to learn more about \"Group B Strep During Pregnancy: Care Instructions.\"  Current as of: April 30, 2024  Content Version: 14.4 2024-2025 [employer].   Care instructions adapted under license by your healthcare professional. If you have questions about a medical condition or this instruction, always ask your healthcare professional. [employer] disclaims any warranty or liability for your use of this information.    "

## 2025-05-22 NOTE — TELEPHONE ENCOUNTER
"Rani Parikh, DO to [facility] Ob/Gyn Triage (Selected Message)        5/22/25  2:38 PM  \"There is not any treatment that the patient can do at home for her GBS status.  We discussed induction of labor after 39 weeks but she did not seem interested in this plan.  She can call and discuss her concerns regarding use of the antibiotic and the baby's gut with her pediatrician.\"  "

## 2025-05-22 NOTE — TELEPHONE ENCOUNTER
"Pt tested positive for GBS and has lots of questions and concerns.  I have been trying to answer her questions as she has them but I also encouraged her to look at the ACOG or CDCs website to read more.    She is asking if there is anything she can do at home to help with this.    I don't believe there is anything she needs or can do at home for GBS.  I have explained to her several times that at this time nothing is needed and she will just receive IV antibiotics during active labor at the hospital.  She is worried that she won't get to the hospital in time to have at least 4 hours of IV abx before baby is born.    Routing to Dr. Parikh to see if she has any recommendations for pt to do now to \"help\" with her GBS.    Cindy Perez RN- OB/GYN group    "

## 2025-05-28 ENCOUNTER — NURSE TRIAGE (OUTPATIENT)
Dept: NURSING | Facility: CLINIC | Age: 35
End: 2025-05-28

## 2025-05-28 NOTE — TELEPHONE ENCOUNTER
OB Triage Call      Is patient's OB/Midwife with the [facility] or [facility]? [facility]- Proceed with triage     Reason for call: labor     Assessment: Patient calls with concerns that she is in labor. Patient has been having contractions every 5 minutes for approx: 30 minutes   Patient woke up and noted fluid with blood at approx: 0100.     Plan: present to L&D at this time    Patient plans to deliver at [facility]    Patient's primary OB Provider is [facility] .      Per protocol recommendations Patient to be evaluated in L&D. Patient's primary OB is [facility].  Labor and delivery at [facility] ([phone]) notified of patient's pending arrival.  Report given to Marnie.      Is patient's delivering hospital on divert? No      39w1d    Estimated Date of Delivery: Igor 3, 2025        OB History    Para Term  AB Living   4 3 3 0 0 3   SAB IAB Ectopic Multiple Live Births   0 0 0 0 3      # Outcome Date GA Lbr Michael/2nd Weight Sex Type Anes PTL Lv   4 Current            3 Term 10/14/19 38w2d 05:53 / 00:39 3.629 kg (8 lb) M Vag-Spont EPI N LINDSAY      Name: SHERRON,BABY BOY      Apgar1: 9  Apgar5: 9   2 Term 17 39w2d 05:40 / 00:27 3.374 kg (7 lb 7 oz) F Vag-Spont EPI N LINDSAY      Name: SHERRON,BABY GIRL      Apgar1: 8  Apgar5: 9   1 Term 14 38w0d 12:00 / 01:00 3.118 kg (6 lb 14 oz) F Vag-Vacuum EPI  LINDSAY      Name: Sarah       Lab Results   Component Value Date    GBS Negative 10/04/2019          Evangelina Reynoso, RN   Reason for Disposition   [1] History of rapid prior delivery AND [2] contractions < 10 minutes apart    Additional Information   Negative: Passed out (i.e., lost consciousness, collapsed and was not responding)   Negative: Shock suspected (e.g., cold/pale/clammy skin, too weak to stand, low BP, rapid pulse)   Negative: Difficult to awaken or acting confused (e.g., disoriented, slurred speech)   Negative: [1] SEVERE abdominal pain (e.g., excruciating) AND [2]  "constant AND [3] present > 1 hour   Negative: SEVERE bleeding (e.g., continuous red blood from vagina, or large blood clots)   Negative: Umbilical cord hanging out of the vagina (shiny, white, curled appearance, \"like telephone cord\")   Negative: Uncontrollable urge to push (i.e., feels like baby is coming out now)   Negative: Can see baby   Negative: Sounds like a life-threatening emergency to the triager   Negative: [1] First baby (primipara) AND [2] contractions < 6 minutes apart  AND [3] present 2 hours   Negative: [1] History of prior delivery (multipara) AND [2] contractions < 10 minutes apart AND [3] present 1 hour    Protocols used: Pregnancy - Labor-A-AH    "

## 2025-06-02 ENCOUNTER — MEDICAL CORRESPONDENCE (OUTPATIENT)
Dept: HEALTH INFORMATION MANAGEMENT | Facility: CLINIC | Age: 35
End: 2025-06-02
Payer: COMMERCIAL